# Patient Record
Sex: MALE | Race: WHITE | NOT HISPANIC OR LATINO | Employment: UNEMPLOYED | URBAN - NONMETROPOLITAN AREA
[De-identification: names, ages, dates, MRNs, and addresses within clinical notes are randomized per-mention and may not be internally consistent; named-entity substitution may affect disease eponyms.]

---

## 2020-07-25 ENCOUNTER — APPOINTMENT (OUTPATIENT)
Dept: CT IMAGING | Facility: HOSPITAL | Age: 55
End: 2020-07-25

## 2020-07-25 ENCOUNTER — APPOINTMENT (OUTPATIENT)
Dept: GENERAL RADIOLOGY | Facility: HOSPITAL | Age: 55
End: 2020-07-25

## 2020-07-25 ENCOUNTER — HOSPITAL ENCOUNTER (EMERGENCY)
Facility: HOSPITAL | Age: 55
Discharge: HOME | End: 2020-07-26
Attending: FAMILY MEDICINE | Admitting: FAMILY MEDICINE

## 2020-07-25 VITALS
HEIGHT: 71 IN | OXYGEN SATURATION: 96 % | RESPIRATION RATE: 18 BRPM | TEMPERATURE: 98.5 F | HEART RATE: 93 BPM | DIASTOLIC BLOOD PRESSURE: 80 MMHG | SYSTOLIC BLOOD PRESSURE: 160 MMHG | WEIGHT: 275 LBS | BODY MASS INDEX: 38.5 KG/M2

## 2020-07-25 DIAGNOSIS — R53.1 WEAKNESS: Primary | ICD-10-CM

## 2020-07-25 DIAGNOSIS — R29.6 RECURRENT FALLS WHILE WALKING: ICD-10-CM

## 2020-07-25 LAB
ALBUMIN SERPL-MCNC: 4.31 G/DL (ref 3.5–5.2)
ALBUMIN/GLOB SERPL: 1.4 G/DL
ALP SERPL-CCNC: 144 U/L (ref 39–117)
ALT SERPL W P-5'-P-CCNC: 22 U/L (ref 1–41)
AMMONIA BLD-SCNC: 41 UMOL/L (ref 16–60)
ANION GAP SERPL CALCULATED.3IONS-SCNC: 15.5 MMOL/L (ref 5–15)
AST SERPL-CCNC: 22 U/L (ref 1–40)
BASOPHILS # BLD AUTO: 0.03 10*3/MM3 (ref 0–0.2)
BASOPHILS NFR BLD AUTO: 1 % (ref 0–1.5)
BILIRUB SERPL-MCNC: 0.6 MG/DL (ref 0–1.2)
BUN SERPL-MCNC: 14 MG/DL (ref 6–20)
BUN/CREAT SERPL: 15.2 (ref 7–25)
CALCIUM SPEC-SCNC: 10.5 MG/DL (ref 8.6–10.5)
CHLORIDE SERPL-SCNC: 98 MMOL/L (ref 98–107)
CO2 SERPL-SCNC: 24.5 MMOL/L (ref 22–29)
CREAT SERPL-MCNC: 0.92 MG/DL (ref 0.76–1.27)
DEPRECATED RDW RBC AUTO: 48 FL (ref 37–54)
EOSINOPHIL # BLD AUTO: 0.07 10*3/MM3 (ref 0–0.4)
EOSINOPHIL NFR BLD AUTO: 2.4 % (ref 0.3–6.2)
ERYTHROCYTE [DISTWIDTH] IN BLOOD BY AUTOMATED COUNT: 15.3 % (ref 12.3–15.4)
GFR SERPL CREATININE-BSD FRML MDRD: 85 ML/MIN/1.73
GLOBULIN UR ELPH-MCNC: 3.2 GM/DL
GLUCOSE SERPL-MCNC: 178 MG/DL (ref 65–99)
HCT VFR BLD AUTO: 47.2 % (ref 37.5–51)
HGB BLD-MCNC: 15 G/DL (ref 13–17.7)
HOLD SPECIMEN: NORMAL
HOLD SPECIMEN: NORMAL
IMM GRANULOCYTES # BLD AUTO: 0.01 10*3/MM3 (ref 0–0.05)
IMM GRANULOCYTES NFR BLD AUTO: 0.3 % (ref 0–0.5)
LYMPHOCYTES # BLD AUTO: 0.83 10*3/MM3 (ref 0.7–3.1)
LYMPHOCYTES NFR BLD AUTO: 28.8 % (ref 19.6–45.3)
MAGNESIUM SERPL-MCNC: 1.8 MG/DL (ref 1.6–2.6)
MCH RBC QN AUTO: 28.1 PG (ref 26.6–33)
MCHC RBC AUTO-ENTMCNC: 31.8 G/DL (ref 31.5–35.7)
MCV RBC AUTO: 88.4 FL (ref 79–97)
MONOCYTES # BLD AUTO: 0.26 10*3/MM3 (ref 0.1–0.9)
MONOCYTES NFR BLD AUTO: 9 % (ref 5–12)
NEUTROPHILS NFR BLD AUTO: 1.68 10*3/MM3 (ref 1.7–7)
NEUTROPHILS NFR BLD AUTO: 58.5 % (ref 42.7–76)
NRBC BLD AUTO-RTO: 0 /100 WBC (ref 0–0.2)
NT-PROBNP SERPL-MCNC: 23.6 PG/ML (ref 0–900)
PLATELET # BLD AUTO: 90 10*3/MM3 (ref 140–450)
PMV BLD AUTO: 10 FL (ref 6–12)
POTASSIUM SERPL-SCNC: 4.5 MMOL/L (ref 3.5–5.2)
PROT SERPL-MCNC: 7.5 G/DL (ref 6–8.5)
RBC # BLD AUTO: 5.34 10*6/MM3 (ref 4.14–5.8)
SODIUM SERPL-SCNC: 138 MMOL/L (ref 136–145)
T4 FREE SERPL-MCNC: 0.88 NG/DL (ref 0.93–1.7)
TROPONIN T SERPL-MCNC: <0.01 NG/ML (ref 0–0.03)
TSH SERPL DL<=0.05 MIU/L-ACNC: 1.36 UIU/ML (ref 0.27–4.2)
WBC # BLD AUTO: 2.88 10*3/MM3 (ref 3.4–10.8)
WHOLE BLOOD HOLD SPECIMEN: NORMAL
WHOLE BLOOD HOLD SPECIMEN: NORMAL

## 2020-07-25 PROCEDURE — 72131 CT LUMBAR SPINE W/O DYE: CPT

## 2020-07-25 PROCEDURE — 84484 ASSAY OF TROPONIN QUANT: CPT | Performed by: FAMILY MEDICINE

## 2020-07-25 PROCEDURE — 99283 EMERGENCY DEPT VISIT LOW MDM: CPT

## 2020-07-25 PROCEDURE — 80050 GENERAL HEALTH PANEL: CPT | Performed by: FAMILY MEDICINE

## 2020-07-25 PROCEDURE — 84439 ASSAY OF FREE THYROXINE: CPT | Performed by: FAMILY MEDICINE

## 2020-07-25 PROCEDURE — 93010 ELECTROCARDIOGRAM REPORT: CPT | Performed by: INTERNAL MEDICINE

## 2020-07-25 PROCEDURE — 72170 X-RAY EXAM OF PELVIS: CPT

## 2020-07-25 PROCEDURE — 82140 ASSAY OF AMMONIA: CPT | Performed by: FAMILY MEDICINE

## 2020-07-25 PROCEDURE — 81003 URINALYSIS AUTO W/O SCOPE: CPT | Performed by: FAMILY MEDICINE

## 2020-07-25 PROCEDURE — 93005 ELECTROCARDIOGRAM TRACING: CPT | Performed by: FAMILY MEDICINE

## 2020-07-25 PROCEDURE — 70450 CT HEAD/BRAIN W/O DYE: CPT

## 2020-07-25 PROCEDURE — 71045 X-RAY EXAM CHEST 1 VIEW: CPT

## 2020-07-25 PROCEDURE — 83735 ASSAY OF MAGNESIUM: CPT | Performed by: FAMILY MEDICINE

## 2020-07-25 PROCEDURE — 83880 ASSAY OF NATRIURETIC PEPTIDE: CPT | Performed by: FAMILY MEDICINE

## 2020-07-25 RX ORDER — SODIUM CHLORIDE 0.9 % (FLUSH) 0.9 %
10 SYRINGE (ML) INJECTION AS NEEDED
Status: DISCONTINUED | OUTPATIENT
Start: 2020-07-25 | End: 2020-07-26 | Stop reason: HOSPADM

## 2020-07-26 LAB
BILIRUB UR QL STRIP: NEGATIVE
CLARITY UR: CLEAR
COLOR UR: YELLOW
GLUCOSE UR STRIP-MCNC: ABNORMAL MG/DL
HGB UR QL STRIP.AUTO: NEGATIVE
KETONES UR QL STRIP: NEGATIVE
LEUKOCYTE ESTERASE UR QL STRIP.AUTO: NEGATIVE
NITRITE UR QL STRIP: NEGATIVE
PH UR STRIP.AUTO: <=5 [PH] (ref 5–8)
PROT UR QL STRIP: NEGATIVE
SP GR UR STRIP: 1.03 (ref 1–1.03)
UROBILINOGEN UR QL STRIP: ABNORMAL

## 2020-07-26 NOTE — ED PROVIDER NOTES
Subjective   54-year-old male presents the emergency room complaints of weakness.  Patient reports he is had generalized weakness since January of this year.  He states that he has had progressive weakness.  He states he had recurrent falls as well.  He states that falls are getting more frequent.  He reports that he has had 2 days ago.  He denies loss conscious.  He does report he is had low back pain as well with his falls.  He states that he seen his family doctor but has not been given any type of work-up or referral.  He reports he is had no chest pain or shortness of breath no abdominal pain.  He does report that he has had occasional episodes of urine incontinence but states he is on a bladder pill for this this is been ongoing for quite some time.      Fatigue   Location:  Generalized  Severity:  Moderate  Duration:  8 months  Timing:  Constant  Progression:  Worsening  Associated symptoms: fatigue    Associated symptoms: no abdominal pain, no chest pain, no congestion, no cough, no diarrhea, no fever, no nausea, no rhinorrhea, no shortness of breath and no wheezing        Review of Systems   Constitutional: Positive for fatigue. Negative for fever.   HENT: Negative for congestion and rhinorrhea.    Respiratory: Negative for cough, shortness of breath and wheezing.    Cardiovascular: Negative for chest pain.   Gastrointestinal: Negative for abdominal pain, diarrhea and nausea.   All other systems reviewed and are negative.      No past medical history on file.    Allergies   Allergen Reactions   • Peanut Oil Nausea Only   • Sulfa Antibiotics Hives       No past surgical history on file.    No family history on file.    Social History     Socioeconomic History   • Marital status:      Spouse name: Not on file   • Number of children: Not on file   • Years of education: Not on file   • Highest education level: Not on file           Objective   Physical Exam   Constitutional: He is oriented to person,  place, and time. No distress.   HENT:   Head: Normocephalic and atraumatic.   Mouth/Throat: Oropharynx is clear and moist.   No hemotympanum.  No midface instability.  No nasal hematoma.   Eyes: Pupils are equal, round, and reactive to light. Conjunctivae and EOM are normal.   Neck: Neck supple. No JVD present.   No carotid bruit   Cardiovascular: Normal rate, regular rhythm and normal heart sounds.   2+ radial pulse 2+ dorsalis pedis pulses   Pulmonary/Chest: Effort normal and breath sounds normal. He has no wheezes. He has no rales.   Speaks in full sentences.  No accessory muscle use   Abdominal: Soft. Bowel sounds are normal. There is no tenderness. There is no rebound and no guarding.   Musculoskeletal: He exhibits edema. He exhibits no tenderness.   1+ pitting of bilateral extremities   Lymphadenopathy:     He has no cervical adenopathy.   Neurological: He is alert and oriented to person, place, and time.   No saddle anesthesia symmetric sensation light touch   Skin: Skin is warm and dry. Capillary refill takes less than 2 seconds.   Psychiatric: He has a normal mood and affect.   Nursing note and vitals reviewed.      Procedures           ED Course  ED Course as of Jul 26 0032   Sat Jul 25, 2020   2311 Ct Head Without Contrast    Result Date: 7/25/2020  Normal, negative unenhanced head CT. Signer Name: MICHAEL May MD  Signed: 7/25/2020 10:38 PM  Workstation Name: [id number]  [facility]    Ct Lumbar Spine Without Contrast    Result Date: 7/25/2020  1. No acute fracture or malalignment. Degenerative disease as above. 2. Partially visible is nonspecific fat stranding in the right lateral abdomen of unknown etiology and significance. Consider follow-up with a CT abdomen and pelvis. Signer Name: Ryley Watson MD  Signed: 7/25/2020 10:55 PM  Workstation Name: [email or web address]  [facility]    Xr Chest 1 View    Result Date: 7/25/2020  No acute cardiopulmonary  findings. Signer Name: Ryley Watson MD  Signed: 7/25/2020 10:41 PM  Workstation Name: [facility]  [facility]    Xr Pelvis 1 Or 2 View    Result Date: 7/25/2020  No acute findings. Signer Name: Ryley Watson MD  Signed: 7/25/2020 10:42 PM  Workstation Name: [facility]  [facility]        [BB]   2336 Patient had degenerative changes on lumbar spine.  Patient awaiting urinalysis.  CT scan shows no acute findings.  Patient symptoms of been ongoing for months.  Patient has no focal neurologic deficits.  Patient would likely benefit from physical therapy as an outpatient setting.  Will repeat cardiac enzymes as well as await urinalysis.    [BB]   Sun Jul 26, 2020   0020 Patient urinalysis unremarkable    [BB]   0029 Patient has had ongoing weakness for months.  Have discussed physical therapy with patient.  Have also discussed neurology evaluation.  Patient is agreeable to this he states that he is agreeable to a short-term rehabilitation facility have discussed contacting social work to assist with patient with obtaining this given that he feels that his primary care is not addressing his issues.    [BB]      ED Course User Index  [BB] Chandra Ram MD                                           [facility]    Final diagnoses:   Weakness   Recurrent falls while walking            Chandra Ram MD  07/26/20 0032

## 2020-07-26 NOTE — ED NOTES
Patient placed in wheelchair in the lobby at this time, PADMINI. KIM.      Jeanie Saenz, RN  07/25/20 5033

## 2020-07-30 ENCOUNTER — HOSPITAL ENCOUNTER (EMERGENCY)
Facility: HOSPITAL | Age: 55
Discharge: STILL ADMITTED OR ADMINISTRATIVE | End: 2020-07-30
Attending: FAMILY MEDICINE

## 2020-07-30 ENCOUNTER — HOSPITAL ENCOUNTER (INPATIENT)
Facility: HOSPITAL | Age: 55
LOS: 5 days | Discharge: HOME | End: 2020-08-04
Attending: PSYCHIATRY & NEUROLOGY

## 2020-07-30 VITALS
TEMPERATURE: 98.3 F | BODY MASS INDEX: 37.52 KG/M2 | HEIGHT: 71 IN | RESPIRATION RATE: 18 BRPM | SYSTOLIC BLOOD PRESSURE: 145 MMHG | HEART RATE: 95 BPM | DIASTOLIC BLOOD PRESSURE: 80 MMHG | WEIGHT: 268 LBS | OXYGEN SATURATION: 98 %

## 2020-07-30 DIAGNOSIS — F11.10 OPIOID ABUSE (HCC): ICD-10-CM

## 2020-07-30 DIAGNOSIS — F13.10 BENZODIAZEPINE ABUSE (HCC): Primary | ICD-10-CM

## 2020-07-30 PROBLEM — F32.9 MDD (MAJOR DEPRESSIVE DISORDER): Status: ACTIVE | Noted: 2020-07-30

## 2020-07-30 LAB
6-ACETYL MORPHINE: NEGATIVE
ALBUMIN SERPL-MCNC: 4.41 G/DL (ref 3.5–5.2)
ALBUMIN/GLOB SERPL: 1.5 G/DL
ALP SERPL-CCNC: 134 U/L (ref 39–117)
ALT SERPL W P-5'-P-CCNC: 21 U/L (ref 1–41)
AMPHET+METHAMPHET UR QL: NEGATIVE
ANION GAP SERPL CALCULATED.3IONS-SCNC: 12.8 MMOL/L (ref 5–15)
AST SERPL-CCNC: 24 U/L (ref 1–40)
BARBITURATES UR QL SCN: NEGATIVE
BASOPHILS # BLD AUTO: 0.02 10*3/MM3 (ref 0–0.2)
BASOPHILS NFR BLD AUTO: 0.7 % (ref 0–1.5)
BENZODIAZ UR QL SCN: POSITIVE
BILIRUB SERPL-MCNC: 0.7 MG/DL (ref 0–1.2)
BILIRUB UR QL STRIP: NEGATIVE
BUN SERPL-MCNC: 15 MG/DL (ref 6–20)
BUN/CREAT SERPL: 15.8 (ref 7–25)
BUPRENORPHINE SERPL-MCNC: NEGATIVE NG/ML
CALCIUM SPEC-SCNC: 10.3 MG/DL (ref 8.6–10.5)
CANNABINOIDS SERPL QL: NEGATIVE
CHLORIDE SERPL-SCNC: 99 MMOL/L (ref 98–107)
CLARITY UR: CLEAR
CO2 SERPL-SCNC: 25.2 MMOL/L (ref 22–29)
COCAINE UR QL: NEGATIVE
COLOR UR: YELLOW
CREAT SERPL-MCNC: 0.95 MG/DL (ref 0.76–1.27)
DEPRECATED RDW RBC AUTO: 50.1 FL (ref 37–54)
EOSINOPHIL # BLD AUTO: 0.11 10*3/MM3 (ref 0–0.4)
EOSINOPHIL NFR BLD AUTO: 3.8 % (ref 0.3–6.2)
ERYTHROCYTE [DISTWIDTH] IN BLOOD BY AUTOMATED COUNT: 15.5 % (ref 12.3–15.4)
ETHANOL BLD-MCNC: <10 MG/DL (ref 0–10)
ETHANOL UR QL: <0.01 %
GFR SERPL CREATININE-BSD FRML MDRD: 82 ML/MIN/1.73
GLOBULIN UR ELPH-MCNC: 3 GM/DL
GLUCOSE SERPL-MCNC: 174 MG/DL (ref 65–99)
GLUCOSE UR STRIP-MCNC: ABNORMAL MG/DL
HCT VFR BLD AUTO: 45.1 % (ref 37.5–51)
HGB BLD-MCNC: 13.8 G/DL (ref 13–17.7)
HGB UR QL STRIP.AUTO: NEGATIVE
IMM GRANULOCYTES # BLD AUTO: 0.01 10*3/MM3 (ref 0–0.05)
IMM GRANULOCYTES NFR BLD AUTO: 0.3 % (ref 0–0.5)
KETONES UR QL STRIP: NEGATIVE
LEUKOCYTE ESTERASE UR QL STRIP.AUTO: NEGATIVE
LYMPHOCYTES # BLD AUTO: 0.97 10*3/MM3 (ref 0.7–3.1)
LYMPHOCYTES NFR BLD AUTO: 33.3 % (ref 19.6–45.3)
MAGNESIUM SERPL-MCNC: 1.7 MG/DL (ref 1.6–2.6)
MCH RBC QN AUTO: 27.5 PG (ref 26.6–33)
MCHC RBC AUTO-ENTMCNC: 30.6 G/DL (ref 31.5–35.7)
MCV RBC AUTO: 89.8 FL (ref 79–97)
METHADONE UR QL SCN: NEGATIVE
MONOCYTES # BLD AUTO: 0.23 10*3/MM3 (ref 0.1–0.9)
MONOCYTES NFR BLD AUTO: 7.9 % (ref 5–12)
NEUTROPHILS NFR BLD AUTO: 1.57 10*3/MM3 (ref 1.7–7)
NEUTROPHILS NFR BLD AUTO: 54 % (ref 42.7–76)
NITRITE UR QL STRIP: NEGATIVE
NRBC BLD AUTO-RTO: 0 /100 WBC (ref 0–0.2)
OPIATES UR QL: NEGATIVE
OXYCODONE UR QL SCN: NEGATIVE
PCP UR QL SCN: NEGATIVE
PH UR STRIP.AUTO: <=5 [PH] (ref 5–8)
PLATELET # BLD AUTO: 84 10*3/MM3 (ref 140–450)
PMV BLD AUTO: 10.2 FL (ref 6–12)
POTASSIUM SERPL-SCNC: 4.6 MMOL/L (ref 3.5–5.2)
PROT SERPL-MCNC: 7.4 G/DL (ref 6–8.5)
PROT UR QL STRIP: NEGATIVE
RBC # BLD AUTO: 5.02 10*6/MM3 (ref 4.14–5.8)
SODIUM SERPL-SCNC: 137 MMOL/L (ref 136–145)
SP GR UR STRIP: >=1.03 (ref 1–1.03)
UROBILINOGEN UR QL STRIP: ABNORMAL
WBC # BLD AUTO: 2.91 10*3/MM3 (ref 3.4–10.8)

## 2020-07-30 PROCEDURE — 81003 URINALYSIS AUTO W/O SCOPE: CPT | Performed by: PHYSICIAN ASSISTANT

## 2020-07-30 PROCEDURE — 80307 DRUG TEST PRSMV CHEM ANLYZR: CPT | Performed by: PHYSICIAN ASSISTANT

## 2020-07-30 PROCEDURE — 80053 COMPREHEN METABOLIC PANEL: CPT | Performed by: PHYSICIAN ASSISTANT

## 2020-07-30 PROCEDURE — 83735 ASSAY OF MAGNESIUM: CPT | Performed by: PHYSICIAN ASSISTANT

## 2020-07-30 PROCEDURE — 99285 EMERGENCY DEPT VISIT HI MDM: CPT

## 2020-07-30 PROCEDURE — 93005 ELECTROCARDIOGRAM TRACING: CPT | Performed by: FAMILY MEDICINE

## 2020-07-30 PROCEDURE — 85025 COMPLETE CBC W/AUTO DIFF WBC: CPT | Performed by: PHYSICIAN ASSISTANT

## 2020-07-30 PROCEDURE — 36415 COLL VENOUS BLD VENIPUNCTURE: CPT

## 2020-07-30 RX ORDER — LACTULOSE 10 G/15ML
20 SOLUTION ORAL DAILY
COMMUNITY

## 2020-07-30 RX ORDER — LOPERAMIDE HYDROCHLORIDE 2 MG/1
2 CAPSULE ORAL
Status: DISCONTINUED | OUTPATIENT
Start: 2020-07-30 | End: 2020-08-04 | Stop reason: HOSPADM

## 2020-07-30 RX ORDER — ONDANSETRON 4 MG/1
4 TABLET, FILM COATED ORAL EVERY 6 HOURS PRN
Status: DISCONTINUED | OUTPATIENT
Start: 2020-07-30 | End: 2020-08-04 | Stop reason: HOSPADM

## 2020-07-30 RX ORDER — SPIRONOLACTONE 50 MG/1
50 TABLET, FILM COATED ORAL DAILY
COMMUNITY

## 2020-07-30 RX ORDER — ALPRAZOLAM 0.5 MG/1
1 TABLET ORAL ONCE
Status: COMPLETED | OUTPATIENT
Start: 2020-07-30 | End: 2020-07-30

## 2020-07-30 RX ORDER — BENZONATATE 100 MG/1
100 CAPSULE ORAL 3 TIMES DAILY PRN
Status: DISCONTINUED | OUTPATIENT
Start: 2020-07-30 | End: 2020-08-04 | Stop reason: HOSPADM

## 2020-07-30 RX ORDER — ALPRAZOLAM 1 MG/1
1 TABLET ORAL DAILY PRN
COMMUNITY
End: 2020-08-04 | Stop reason: HOSPADM

## 2020-07-30 RX ORDER — POTASSIUM CHLORIDE 20 MEQ/1
20 TABLET, EXTENDED RELEASE ORAL DAILY
COMMUNITY

## 2020-07-30 RX ORDER — NICOTINE POLACRILEX 4 MG
15 LOZENGE BUCCAL
Status: DISCONTINUED | OUTPATIENT
Start: 2020-07-30 | End: 2020-08-04 | Stop reason: HOSPADM

## 2020-07-30 RX ORDER — OXYBUTYNIN CHLORIDE 5 MG/1
2.5 TABLET ORAL 2 TIMES DAILY
COMMUNITY

## 2020-07-30 RX ORDER — BENZTROPINE MESYLATE 1 MG/1
2 TABLET ORAL ONCE AS NEEDED
Status: DISCONTINUED | OUTPATIENT
Start: 2020-07-30 | End: 2020-08-04 | Stop reason: HOSPADM

## 2020-07-30 RX ORDER — ATORVASTATIN CALCIUM 80 MG/1
80 TABLET, FILM COATED ORAL DAILY
COMMUNITY

## 2020-07-30 RX ORDER — GABAPENTIN 800 MG/1
800 TABLET ORAL 3 TIMES DAILY
COMMUNITY

## 2020-07-30 RX ORDER — ECHINACEA PURPUREA EXTRACT 125 MG
2 TABLET ORAL AS NEEDED
Status: DISCONTINUED | OUTPATIENT
Start: 2020-07-30 | End: 2020-08-04 | Stop reason: HOSPADM

## 2020-07-30 RX ORDER — FUROSEMIDE 20 MG/1
20 TABLET ORAL DAILY
COMMUNITY

## 2020-07-30 RX ORDER — LORATADINE 10 MG/1
10 TABLET ORAL DAILY
COMMUNITY

## 2020-07-30 RX ORDER — FAMOTIDINE 20 MG/1
20 TABLET, FILM COATED ORAL 2 TIMES DAILY PRN
Status: DISCONTINUED | OUTPATIENT
Start: 2020-07-30 | End: 2020-08-04 | Stop reason: HOSPADM

## 2020-07-30 RX ORDER — LISINOPRIL 10 MG/1
10 TABLET ORAL DAILY
COMMUNITY

## 2020-07-30 RX ORDER — GABAPENTIN 400 MG/1
800 CAPSULE ORAL 3 TIMES DAILY
Status: CANCELLED | OUTPATIENT
Start: 2020-07-31

## 2020-07-30 RX ORDER — TRAZODONE HYDROCHLORIDE 150 MG/1
150 TABLET ORAL NIGHTLY
COMMUNITY

## 2020-07-30 RX ORDER — HYDROXYZINE 50 MG/1
50 TABLET, FILM COATED ORAL EVERY 6 HOURS PRN
Status: DISCONTINUED | OUTPATIENT
Start: 2020-07-30 | End: 2020-08-04 | Stop reason: HOSPADM

## 2020-07-30 RX ORDER — ACETAMINOPHEN 325 MG/1
650 TABLET ORAL EVERY 6 HOURS PRN
Status: DISCONTINUED | OUTPATIENT
Start: 2020-07-30 | End: 2020-08-04 | Stop reason: HOSPADM

## 2020-07-30 RX ORDER — AMITRIPTYLINE HYDROCHLORIDE 25 MG/1
25 TABLET, FILM COATED ORAL NIGHTLY
COMMUNITY

## 2020-07-30 RX ORDER — IBUPROFEN 400 MG/1
400 TABLET ORAL EVERY 6 HOURS PRN
Status: DISCONTINUED | OUTPATIENT
Start: 2020-07-30 | End: 2020-08-04 | Stop reason: HOSPADM

## 2020-07-30 RX ORDER — NADOLOL 20 MG/1
20 TABLET ORAL DAILY
COMMUNITY

## 2020-07-30 RX ORDER — PANTOPRAZOLE SODIUM 40 MG/1
40 TABLET, DELAYED RELEASE ORAL DAILY
COMMUNITY

## 2020-07-30 RX ORDER — TRAZODONE HYDROCHLORIDE 50 MG/1
150 TABLET ORAL NIGHTLY
Status: CANCELLED | OUTPATIENT
Start: 2020-07-31

## 2020-07-30 RX ORDER — FLUTICASONE PROPIONATE 50 MCG
1 SPRAY, SUSPENSION (ML) NASAL DAILY
COMMUNITY

## 2020-07-30 RX ORDER — ALBUTEROL SULFATE 2.5 MG/3ML
2.5 SOLUTION RESPIRATORY (INHALATION) EVERY 6 HOURS PRN
COMMUNITY

## 2020-07-30 RX ORDER — BENZTROPINE MESYLATE 1 MG/ML
1 INJECTION INTRAMUSCULAR; INTRAVENOUS ONCE AS NEEDED
Status: DISCONTINUED | OUTPATIENT
Start: 2020-07-30 | End: 2020-08-04 | Stop reason: HOSPADM

## 2020-07-30 RX ORDER — ALUMINA, MAGNESIA, AND SIMETHICONE 2400; 2400; 240 MG/30ML; MG/30ML; MG/30ML
15 SUSPENSION ORAL EVERY 6 HOURS PRN
Status: DISCONTINUED | OUTPATIENT
Start: 2020-07-30 | End: 2020-08-04 | Stop reason: HOSPADM

## 2020-07-30 RX ORDER — ALPRAZOLAM 0.5 MG/1
1 TABLET ORAL DAILY PRN
Status: CANCELLED | OUTPATIENT
Start: 2020-07-30

## 2020-07-30 RX ORDER — DEXTROSE MONOHYDRATE 25 G/50ML
25 INJECTION, SOLUTION INTRAVENOUS
Status: DISCONTINUED | OUTPATIENT
Start: 2020-07-30 | End: 2020-08-04 | Stop reason: HOSPADM

## 2020-07-30 RX ORDER — TRAZODONE HYDROCHLORIDE 50 MG/1
50 TABLET ORAL NIGHTLY PRN
Status: DISCONTINUED | OUTPATIENT
Start: 2020-07-30 | End: 2020-08-01

## 2020-07-30 RX ORDER — HYDROCODONE BITARTRATE AND ACETAMINOPHEN 10; 325 MG/1; MG/1
1 TABLET ORAL EVERY 8 HOURS PRN
COMMUNITY
End: 2020-08-04 | Stop reason: HOSPADM

## 2020-07-30 RX ORDER — HYDROCODONE BITARTRATE AND ACETAMINOPHEN 10; 325 MG/1; MG/1
1 TABLET ORAL EVERY 8 HOURS PRN
Status: CANCELLED | OUTPATIENT
Start: 2020-07-30

## 2020-07-30 RX ORDER — NITROGLYCERIN 0.4 MG/1
0.4 TABLET SUBLINGUAL
COMMUNITY

## 2020-07-30 RX ORDER — ASPIRIN 81 MG/1
81 TABLET ORAL DAILY
COMMUNITY

## 2020-07-30 RX ADMIN — TRAZODONE HYDROCHLORIDE 50 MG: 50 TABLET ORAL at 23:47

## 2020-07-30 RX ADMIN — ALPRAZOLAM 1 MG: 0.5 TABLET ORAL at 23:46

## 2020-07-30 NOTE — DISCHARGE PLACEMENT REQUEST
"Eduardo Ryan (54 y.o. Male)     Date of Birth Social Security Number Address Home Phone MRN    1965  [address] [place] [id number] [phone] [id number]    Yazidism Marital Status          None        Admission Date Admission Type Admitting Provider Attending Provider Department, Room/Bed    7/25/20 Emergency Chandra Ram MD  [facility] Emergency Department, 116/16    Discharge Date Discharge Disposition Discharge Destination        7/26/2020 Home or Self Care              Attending Provider:  (none)   Allergies:  Peanut Oil, Sulfa Antibiotics    Isolation:  None   Infection:  None   Code Status:  Not on file    Ht:  180.3 cm (71\")   Wt:  125 kg (275 lb)    Admission Cmt:  None   Principal Problem:  None                Active Insurance as of 7/25/2020     Primary Coverage     Payor Plan Insurance Group Employer/Plan Group    [facility] [id number]     Payor Plan Address Payor Plan Phone Number Payor Plan Fax Number Effective Dates    [address] [address] [phone]  7/25/2020 - None Entered    [place] [address]       Subscriber Name Subscriber Birth Date Member ID       EDUARDO RYAN 1965 [id number]                 Emergency Contacts      (Rel.) Home Phone Work Phone Mobile Phone    ECHO RYAN (Spouse) [phone] -- --            Emergency Contact Information     Name Relation Home Work Mobile    ECHO RYAN Spouse [phone]            Insurance Information                [facility] MEDICAID Phone: [phone]    Subscriber: Eduardo Ryan Subscriber#: [id number]    Group#: [id number] Precert#:           Treatment Team     Provider Relationship Specialty Contact    Italo Moreno, RN Registered Nurse --               Lab Results (most recent)     Procedure Component Value Units Date/Time    Urinalysis With Microscopic If Indicated (No Culture) - Urine, Clean Catch [[id number]]  (Abnormal) Collected:  " 07/25/20 2355    Specimen:  Urine, Clean Catch Updated:  07/26/20 0012     Color, UA Yellow     Appearance, UA Clear     pH, UA <=5.0     Specific Gravity, UA 1.028     Glucose, UA >=1000 mg/dL (3+)     Ketones, UA Negative     Bilirubin, UA Negative     Blood, UA Negative     Protein, UA Negative     Leuk Esterase, UA Negative     Nitrite, UA Negative     Urobilinogen, UA 1.0 E.U./dL    Narrative:       Urine microscopic not indicated.    [place] Draw [[id number]] Collected:  07/25/20 2217    Specimen:  Blood Updated:  07/25/20 2330    Narrative:       The following orders were created for panel order [place] [facility].  Procedure                               Abnormality         Status                     ---------                               -----------         ------                     Light Blue Top[[id number]]                                   Final result               Green Top (Gel)[[id number]]                                  Final result               Lavender Top[[id number]]                                     Final result               Gold Top - SST[[id number]]                                   Final result                 Please view results for these tests on the individual orders.    Light Blue Top [[id number]] Collected:  07/25/20 2217    Specimen:  Blood Updated:  07/25/20 2330     Extra Tube hold for add-on     Comment: Auto resulted       Green Top (Gel) [[id number]] Collected:  07/25/20 2217    Specimen:  Blood Updated:  07/25/20 2330     Extra Tube Hold for add-ons.     Comment: Auto resulted.       Lavender Top [[id number]] Collected:  07/25/20 2217    Specimen:  Blood Updated:  07/25/20 2330     Extra Tube hold for add-on     Comment: Auto resulted       Gold Top - SST [[id number]] Collected:  07/25/20 2217    Specimen:  Blood Updated:  07/25/20 2330     Extra Tube Hold for add-ons.     Comment: Auto resulted.       BNP [[id number]]  (Normal) Collected:  07/25/20 2217    Specimen:  Blood Updated:   07/25/20 2256     proBNP 23.6 pg/mL     Narrative:       Among patients with dyspnea, NT-proBNP is highly sensitive for the detection of acute congestive heart failure. In addition NT-proBNP of <300 pg/ml effectively rules out acute congestive heart failure with 99% negative predictive value.    Results may be falsely decreased if patient taking Biotin.      Troponin [[id number]]  (Normal) Collected:  07/25/20 2217    Specimen:  Blood Updated:  07/25/20 2256     Troponin T <0.010 ng/mL     Narrative:       Troponin T Reference Range:  <= 0.03 ng/mL-   Negative for AMI  >0.03 ng/mL-     Abnormal for myocardial necrosis.  Clinicians would have to utilize clinical acumen, EKG, Troponin and serial changes to determine if it is an Acute Myocardial Infarction or myocardial injury due to an underlying chronic condition.       Results may be falsely decreased if patient taking Biotin.      TSH [[id number]]  (Normal) Collected:  07/25/20 2217    Specimen:  Blood Updated:  07/25/20 2256     TSH 1.360 uIU/mL     T4, Free [[id number]]  (Abnormal) Collected:  07/25/20 2217    Specimen:  Blood Updated:  07/25/20 2256     Free T4 0.88 ng/dL     Narrative:       Results may be falsely increased if patient taking Biotin.      Comprehensive Metabolic Panel [[id number]]  (Abnormal) Collected:  07/25/20 2217    Specimen:  Blood Updated:  07/25/20 2249     Glucose 178 mg/dL      BUN 14 mg/dL      Creatinine 0.92 mg/dL      Sodium 138 mmol/L      Potassium 4.5 mmol/L      Comment: Specimen hemolyzed.  Results may be affected.        Chloride 98 mmol/L      CO2 24.5 mmol/L      Calcium 10.5 mg/dL      Total Protein 7.5 g/dL      Albumin 4.31 g/dL      ALT (SGPT) 22 U/L      AST (SGOT) 22 U/L      Alkaline Phosphatase 144 U/L      Total Bilirubin 0.6 mg/dL      eGFR Non African Amer 85 mL/min/1.73      Globulin 3.2 gm/dL      A/G Ratio 1.4 g/dL      BUN/Creatinine Ratio 15.2     Anion Gap 15.5 mmol/L     Narrative:       GFR Normal  >60  Chronic Kidney Disease <60  Kidney Failure <15      Magnesium [[id number]]  (Normal) Collected:  07/25/20 2217    Specimen:  Blood Updated:  07/25/20 2249     Magnesium 1.8 mg/dL     CBC & Differential [[id number]] Collected:  07/25/20 2217    Specimen:  Blood Updated:  07/25/20 2246    Narrative:       The following orders were created for panel order CBC & Differential.  Procedure                               Abnormality         Status                     ---------                               -----------         ------                     CBC Auto Differential[[id number]]        Abnormal            Final result                 Please view results for these tests on the individual orders.    CBC Auto Differential [[id number]]  (Abnormal) Collected:  07/25/20 2217    Specimen:  Blood Updated:  07/25/20 2246     WBC 2.88 10*3/mm3      RBC 5.34 10*6/mm3      Hemoglobin 15.0 g/dL      Hematocrit 47.2 %      MCV 88.4 fL      MCH 28.1 pg      MCHC 31.8 g/dL      RDW 15.3 %      RDW-SD 48.0 fl      MPV 10.0 fL      Platelets 90 10*3/mm3      Neutrophil % 58.5 %      Lymphocyte % 28.8 %      Monocyte % 9.0 %      Eosinophil % 2.4 %      Basophil % 1.0 %      Immature Grans % 0.3 %      Neutrophils, Absolute 1.68 10*3/mm3      Lymphocytes, Absolute 0.83 10*3/mm3      Monocytes, Absolute 0.26 10*3/mm3      Eosinophils, Absolute 0.07 10*3/mm3      Basophils, Absolute 0.03 10*3/mm3      Immature Grans, Absolute 0.01 10*3/mm3      nRBC 0.0 /100 WBC     Ammonia [[id number]]  (Normal) Collected:  07/25/20 2217    Specimen:  Blood Updated:  07/25/20 2244     Ammonia 41 umol/L

## 2020-07-30 NOTE — DISCHARGE PLACEMENT REQUEST
"Jacek Ryan (54 y.o. Male)     Date of Birth Social Security Number Address Home Phone MRN    1965  [address] [id number] [phone] [id number]    Pentecostalism Marital Status          None        Admission Date Admission Type Admitting Provider Attending Provider Department, Room/Bed    7/25/20 Emergency Chandra Ram MD  [facility] Emergency Department, 116/16    Discharge Date Discharge Disposition Discharge Destination        7/26/2020 Home or Self Care              Attending Provider:  (none)   Allergies:  Peanut Oil, Sulfa Antibiotics    Isolation:  None   Infection:  None   Code Status:  Not on file    Ht:  180.3 cm (71\")   Wt:  125 kg (275 lb)    Admission Cmt:  None   Principal Problem:  None                Active Insurance as of 7/25/2020     Primary Coverage     Payor Plan Insurance Group Employer/Plan Group    [employer] [place] [place]     Payor Plan Address Payor Plan Phone Number Payor Plan Fax Number Effective Dates    [address] [address] [phone]  7/25/2020 - None Entered    [facility] [id number]       Subscriber Name Subscriber Birth Date Member ID       JACEK RYAN 1965 [id number]                 Emergency Contacts      (Rel.) Home Phone Work Phone Mobile Phone    ECHO RYAN (Spouse) [phone] -- --            {Documentation Categories:[id number]}  "

## 2020-07-31 ENCOUNTER — HOSPITAL ENCOUNTER (INPATIENT)
Facility: HOSPITAL | Age: 55
End: 2020-07-31
Attending: PSYCHIATRY & NEUROLOGY | Admitting: PSYCHIATRY & NEUROLOGY

## 2020-07-31 LAB
BASOPHILS # BLD AUTO: 0.05 10*3/MM3 (ref 0–0.2)
BASOPHILS NFR BLD AUTO: 1.5 % (ref 0–1.5)
CRP SERPL-MCNC: 0.84 MG/DL (ref 0–0.5)
D DIMER PPP FEU-MCNC: 0.68 MCGFEU/ML (ref 0–0.5)
D-LACTATE SERPL-SCNC: 3.3 MMOL/L (ref 0.5–2)
DEPRECATED RDW RBC AUTO: 49.5 FL (ref 37–54)
EOSINOPHIL # BLD AUTO: 0.09 10*3/MM3 (ref 0–0.4)
EOSINOPHIL NFR BLD AUTO: 2.7 % (ref 0.3–6.2)
ERYTHROCYTE [DISTWIDTH] IN BLOOD BY AUTOMATED COUNT: 15.4 % (ref 12.3–15.4)
GLUCOSE BLDC GLUCOMTR-MCNC: 118 MG/DL (ref 70–130)
GLUCOSE BLDC GLUCOMTR-MCNC: 133 MG/DL (ref 70–130)
GLUCOSE BLDC GLUCOMTR-MCNC: 145 MG/DL (ref 70–130)
GLUCOSE BLDC GLUCOMTR-MCNC: 274 MG/DL (ref 70–130)
HCT VFR BLD AUTO: 48.8 % (ref 37.5–51)
HGB BLD-MCNC: 15.1 G/DL (ref 13–17.7)
IMM GRANULOCYTES # BLD AUTO: 0.01 10*3/MM3 (ref 0–0.05)
IMM GRANULOCYTES NFR BLD AUTO: 0.3 % (ref 0–0.5)
LACTATE HOLD SPECIMEN: NORMAL
LDH SERPL-CCNC: 126 U/L (ref 135–225)
LYMPHOCYTES # BLD AUTO: 0.8 10*3/MM3 (ref 0.7–3.1)
LYMPHOCYTES NFR BLD AUTO: 24.2 % (ref 19.6–45.3)
MCH RBC QN AUTO: 27.7 PG (ref 26.6–33)
MCHC RBC AUTO-ENTMCNC: 30.9 G/DL (ref 31.5–35.7)
MCV RBC AUTO: 89.4 FL (ref 79–97)
MONOCYTES # BLD AUTO: 0.35 10*3/MM3 (ref 0.1–0.9)
MONOCYTES NFR BLD AUTO: 10.6 % (ref 5–12)
NEUTROPHILS NFR BLD AUTO: 2 10*3/MM3 (ref 1.7–7)
NEUTROPHILS NFR BLD AUTO: 60.7 % (ref 42.7–76)
NRBC BLD AUTO-RTO: 0 /100 WBC (ref 0–0.2)
PLATELET # BLD AUTO: 77 10*3/MM3 (ref 140–450)
PMV BLD AUTO: 10 FL (ref 6–12)
RBC # BLD AUTO: 5.46 10*6/MM3 (ref 4.14–5.8)
SARS-COV-2 RDRP RESP QL NAA+PROBE: NOT DETECTED
WBC # BLD AUTO: 3.3 10*3/MM3 (ref 3.4–10.8)

## 2020-07-31 PROCEDURE — 83605 ASSAY OF LACTIC ACID: CPT | Performed by: INTERNAL MEDICINE

## 2020-07-31 PROCEDURE — 99253 IP/OBS CNSLTJ NEW/EST LOW 45: CPT | Performed by: INTERNAL MEDICINE

## 2020-07-31 PROCEDURE — 87635 SARS-COV-2 COVID-19 AMP PRB: CPT | Performed by: INTERNAL MEDICINE

## 2020-07-31 PROCEDURE — 82962 GLUCOSE BLOOD TEST: CPT

## 2020-07-31 PROCEDURE — 85025 COMPLETE CBC W/AUTO DIFF WBC: CPT | Performed by: PSYCHIATRY & NEUROLOGY

## 2020-07-31 PROCEDURE — 85379 FIBRIN DEGRADATION QUANT: CPT | Performed by: INTERNAL MEDICINE

## 2020-07-31 PROCEDURE — 94799 UNLISTED PULMONARY SVC/PX: CPT

## 2020-07-31 PROCEDURE — 84145 PROCALCITONIN (PCT): CPT | Performed by: INTERNAL MEDICINE

## 2020-07-31 PROCEDURE — 63710000001 INSULIN ASPART PER 5 UNITS: Performed by: PSYCHIATRY & NEUROLOGY

## 2020-07-31 PROCEDURE — 86140 C-REACTIVE PROTEIN: CPT | Performed by: INTERNAL MEDICINE

## 2020-07-31 PROCEDURE — 83615 LACTATE (LD) (LDH) ENZYME: CPT | Performed by: INTERNAL MEDICINE

## 2020-07-31 RX ORDER — OXYBUTYNIN CHLORIDE 5 MG/1
2.5 TABLET ORAL 2 TIMES DAILY
Status: DISCONTINUED | OUTPATIENT
Start: 2020-07-31 | End: 2020-08-04 | Stop reason: HOSPADM

## 2020-07-31 RX ORDER — CYCLOBENZAPRINE HCL 10 MG
10 TABLET ORAL 3 TIMES DAILY PRN
Status: DISCONTINUED | OUTPATIENT
Start: 2020-07-31 | End: 2020-08-04 | Stop reason: HOSPADM

## 2020-07-31 RX ORDER — LORAZEPAM 2 MG/1
2 TABLET ORAL EVERY 4 HOURS PRN
Status: DISPENSED | OUTPATIENT
Start: 2020-08-01 | End: 2020-08-02

## 2020-07-31 RX ORDER — LACTULOSE 10 G/15ML
20 SOLUTION ORAL DAILY
Status: DISCONTINUED | OUTPATIENT
Start: 2020-07-31 | End: 2020-08-04 | Stop reason: HOSPADM

## 2020-07-31 RX ORDER — LORAZEPAM 0.5 MG/1
0.5 TABLET ORAL EVERY 4 HOURS PRN
Status: DISCONTINUED | OUTPATIENT
Start: 2020-08-04 | End: 2020-08-02

## 2020-07-31 RX ORDER — CLONIDINE HYDROCHLORIDE 0.1 MG/1
0.1 TABLET ORAL 2 TIMES DAILY PRN
Status: ACTIVE | OUTPATIENT
Start: 2020-08-03 | End: 2020-08-04

## 2020-07-31 RX ORDER — ASPIRIN 81 MG/1
81 TABLET ORAL DAILY
Status: DISCONTINUED | OUTPATIENT
Start: 2020-07-31 | End: 2020-08-04 | Stop reason: HOSPADM

## 2020-07-31 RX ORDER — FUROSEMIDE 20 MG/1
20 TABLET ORAL DAILY
Status: DISCONTINUED | OUTPATIENT
Start: 2020-07-31 | End: 2020-08-04 | Stop reason: HOSPADM

## 2020-07-31 RX ORDER — LISINOPRIL 10 MG/1
10 TABLET ORAL DAILY
Status: DISCONTINUED | OUTPATIENT
Start: 2020-07-31 | End: 2020-08-04 | Stop reason: HOSPADM

## 2020-07-31 RX ORDER — CLONIDINE HYDROCHLORIDE 0.1 MG/1
0.1 TABLET ORAL 4 TIMES DAILY PRN
Status: ACTIVE | OUTPATIENT
Start: 2020-08-01 | End: 2020-08-02

## 2020-07-31 RX ORDER — POTASSIUM CHLORIDE 20 MEQ/1
20 TABLET, EXTENDED RELEASE ORAL DAILY
Status: DISCONTINUED | OUTPATIENT
Start: 2020-07-31 | End: 2020-08-04 | Stop reason: HOSPADM

## 2020-07-31 RX ORDER — ALBUTEROL SULFATE 2.5 MG/3ML
2.5 SOLUTION RESPIRATORY (INHALATION) EVERY 6 HOURS PRN
Status: DISCONTINUED | OUTPATIENT
Start: 2020-07-31 | End: 2020-08-04 | Stop reason: HOSPADM

## 2020-07-31 RX ORDER — CLONIDINE HYDROCHLORIDE 0.1 MG/1
0.1 TABLET ORAL DAILY PRN
Status: DISCONTINUED | OUTPATIENT
Start: 2020-08-04 | End: 2020-08-04 | Stop reason: HOSPADM

## 2020-07-31 RX ORDER — LORAZEPAM 1 MG/1
1 TABLET ORAL EVERY 4 HOURS PRN
Status: DISCONTINUED | OUTPATIENT
Start: 2020-08-03 | End: 2020-08-02

## 2020-07-31 RX ORDER — PANTOPRAZOLE SODIUM 40 MG/1
40 TABLET, DELAYED RELEASE ORAL DAILY
Status: DISCONTINUED | OUTPATIENT
Start: 2020-07-31 | End: 2020-08-04 | Stop reason: HOSPADM

## 2020-07-31 RX ORDER — SPIRONOLACTONE 25 MG/1
50 TABLET ORAL DAILY
Status: DISCONTINUED | OUTPATIENT
Start: 2020-07-31 | End: 2020-08-04 | Stop reason: HOSPADM

## 2020-07-31 RX ORDER — CLONIDINE HYDROCHLORIDE 0.1 MG/1
0.1 TABLET ORAL 4 TIMES DAILY PRN
Status: ACTIVE | OUTPATIENT
Start: 2020-07-31 | End: 2020-08-01

## 2020-07-31 RX ORDER — DICYCLOMINE HYDROCHLORIDE 10 MG/1
10 CAPSULE ORAL 3 TIMES DAILY PRN
Status: DISCONTINUED | OUTPATIENT
Start: 2020-07-31 | End: 2020-08-04 | Stop reason: HOSPADM

## 2020-07-31 RX ORDER — AMITRIPTYLINE HYDROCHLORIDE 25 MG/1
25 TABLET, FILM COATED ORAL NIGHTLY
Status: DISCONTINUED | OUTPATIENT
Start: 2020-08-01 | End: 2020-08-04 | Stop reason: HOSPADM

## 2020-07-31 RX ORDER — ATORVASTATIN CALCIUM 40 MG/1
80 TABLET, FILM COATED ORAL DAILY
Status: DISCONTINUED | OUTPATIENT
Start: 2020-07-31 | End: 2020-08-04 | Stop reason: HOSPADM

## 2020-07-31 RX ORDER — FLUTICASONE PROPIONATE 50 MCG
1 SPRAY, SUSPENSION (ML) NASAL DAILY
Status: DISCONTINUED | OUTPATIENT
Start: 2020-07-31 | End: 2020-08-04 | Stop reason: HOSPADM

## 2020-07-31 RX ORDER — CLONIDINE HYDROCHLORIDE 0.1 MG/1
0.1 TABLET ORAL 3 TIMES DAILY PRN
Status: ACTIVE | OUTPATIENT
Start: 2020-08-02 | End: 2020-08-03

## 2020-07-31 RX ORDER — CETIRIZINE HYDROCHLORIDE 10 MG/1
10 TABLET ORAL DAILY
Status: DISCONTINUED | OUTPATIENT
Start: 2020-07-31 | End: 2020-08-04 | Stop reason: HOSPADM

## 2020-07-31 RX ORDER — NITROGLYCERIN 0.4 MG/1
0.4 TABLET SUBLINGUAL
Status: DISCONTINUED | OUTPATIENT
Start: 2020-07-31 | End: 2020-08-04 | Stop reason: HOSPADM

## 2020-07-31 RX ORDER — LORAZEPAM 2 MG/1
2 TABLET ORAL
Status: DISPENSED | OUTPATIENT
Start: 2020-07-31 | End: 2020-08-01

## 2020-07-31 RX ORDER — NADOLOL 40 MG/1
20 TABLET ORAL DAILY
Status: DISCONTINUED | OUTPATIENT
Start: 2020-07-31 | End: 2020-08-04 | Stop reason: HOSPADM

## 2020-07-31 RX ADMIN — FUROSEMIDE 20 MG: 20 TABLET ORAL at 09:29

## 2020-07-31 RX ADMIN — POTASSIUM CHLORIDE 20 MEQ: 1500 TABLET, EXTENDED RELEASE ORAL at 09:30

## 2020-07-31 RX ADMIN — LISINOPRIL 10 MG: 10 TABLET ORAL at 09:30

## 2020-07-31 RX ADMIN — METFORMIN HYDROCHLORIDE 1000 MG: 500 TABLET ORAL at 07:39

## 2020-07-31 RX ADMIN — OXYBUTYNIN CHLORIDE 2.5 MG: 5 TABLET ORAL at 21:58

## 2020-07-31 RX ADMIN — PANTOPRAZOLE SODIUM 40 MG: 40 TABLET, DELAYED RELEASE ORAL at 09:31

## 2020-07-31 RX ADMIN — ATORVASTATIN CALCIUM 80 MG: 40 TABLET, FILM COATED ORAL at 09:29

## 2020-07-31 RX ADMIN — CETIRIZINE HYDROCHLORIDE 10 MG: 10 TABLET, FILM COATED ORAL at 09:29

## 2020-07-31 RX ADMIN — INSULIN ASPART 4 UNITS: 100 INJECTION, SOLUTION INTRAVENOUS; SUBCUTANEOUS at 11:11

## 2020-07-31 RX ADMIN — IBUPROFEN 400 MG: 400 TABLET, FILM COATED ORAL at 22:01

## 2020-07-31 RX ADMIN — OXYBUTYNIN CHLORIDE 2.5 MG: 5 TABLET ORAL at 09:29

## 2020-07-31 RX ADMIN — METFORMIN HYDROCHLORIDE 1000 MG: 500 TABLET ORAL at 17:39

## 2020-07-31 RX ADMIN — CYCLOBENZAPRINE HYDROCHLORIDE 10 MG: 10 TABLET, FILM COATED ORAL at 22:01

## 2020-07-31 RX ADMIN — ASPIRIN 81 MG: 81 TABLET, COATED ORAL at 09:29

## 2020-07-31 RX ADMIN — SPIRONOLACTONE 50 MG: 25 TABLET ORAL at 09:29

## 2020-07-31 RX ADMIN — LACTULOSE 20 G: 10 SOLUTION ORAL at 09:31

## 2020-07-31 RX ADMIN — FLUTICASONE PROPIONATE 1 SPRAY: 50 SPRAY, METERED NASAL at 09:31

## 2020-08-01 ENCOUNTER — APPOINTMENT (OUTPATIENT)
Dept: CT IMAGING | Facility: HOSPITAL | Age: 55
End: 2020-08-01

## 2020-08-01 ENCOUNTER — APPOINTMENT (OUTPATIENT)
Dept: ULTRASOUND IMAGING | Facility: HOSPITAL | Age: 55
End: 2020-08-01

## 2020-08-01 LAB
BASOPHILS # BLD AUTO: 0.03 10*3/MM3 (ref 0–0.2)
BASOPHILS NFR BLD AUTO: 1.1 % (ref 0–1.5)
D-LACTATE SERPL-SCNC: 2.5 MMOL/L (ref 0.5–2)
DEPRECATED RDW RBC AUTO: 48.7 FL (ref 37–54)
EOSINOPHIL # BLD AUTO: 0.1 10*3/MM3 (ref 0–0.4)
EOSINOPHIL NFR BLD AUTO: 3.8 % (ref 0.3–6.2)
ERYTHROCYTE [DISTWIDTH] IN BLOOD BY AUTOMATED COUNT: 15.3 % (ref 12.3–15.4)
GLUCOSE BLDC GLUCOMTR-MCNC: 115 MG/DL (ref 70–130)
GLUCOSE BLDC GLUCOMTR-MCNC: 131 MG/DL (ref 70–130)
GLUCOSE BLDC GLUCOMTR-MCNC: 138 MG/DL (ref 70–130)
GLUCOSE BLDC GLUCOMTR-MCNC: 157 MG/DL (ref 70–130)
HCT VFR BLD AUTO: 44.8 % (ref 37.5–51)
HGB BLD-MCNC: 14 G/DL (ref 13–17.7)
IMM GRANULOCYTES # BLD AUTO: 0.02 10*3/MM3 (ref 0–0.05)
IMM GRANULOCYTES NFR BLD AUTO: 0.8 % (ref 0–0.5)
LYMPHOCYTES # BLD AUTO: 0.71 10*3/MM3 (ref 0.7–3.1)
LYMPHOCYTES NFR BLD AUTO: 27.2 % (ref 19.6–45.3)
MCH RBC QN AUTO: 27.7 PG (ref 26.6–33)
MCHC RBC AUTO-ENTMCNC: 31.3 G/DL (ref 31.5–35.7)
MCV RBC AUTO: 88.7 FL (ref 79–97)
MONOCYTES # BLD AUTO: 0.24 10*3/MM3 (ref 0.1–0.9)
MONOCYTES NFR BLD AUTO: 9.2 % (ref 5–12)
NEUTROPHILS NFR BLD AUTO: 1.51 10*3/MM3 (ref 1.7–7)
NEUTROPHILS NFR BLD AUTO: 57.9 % (ref 42.7–76)
NRBC BLD AUTO-RTO: 0 /100 WBC (ref 0–0.2)
PLATELET # BLD AUTO: 64 10*3/MM3 (ref 140–450)
PMV BLD AUTO: 10.3 FL (ref 6–12)
PROCALCITONIN SERPL-MCNC: 0.09 NG/ML (ref 0–0.25)
RBC # BLD AUTO: 5.05 10*6/MM3 (ref 4.14–5.8)
WBC # BLD AUTO: 2.61 10*3/MM3 (ref 3.4–10.8)

## 2020-08-01 PROCEDURE — 99233 SBSQ HOSP IP/OBS HIGH 50: CPT | Performed by: PSYCHIATRY & NEUROLOGY

## 2020-08-01 PROCEDURE — 94799 UNLISTED PULMONARY SVC/PX: CPT

## 2020-08-01 PROCEDURE — 93970 EXTREMITY STUDY: CPT

## 2020-08-01 PROCEDURE — 82962 GLUCOSE BLOOD TEST: CPT

## 2020-08-01 PROCEDURE — 71275 CT ANGIOGRAPHY CHEST: CPT

## 2020-08-01 PROCEDURE — 85025 COMPLETE CBC W/AUTO DIFF WBC: CPT | Performed by: PSYCHIATRY & NEUROLOGY

## 2020-08-01 PROCEDURE — 0 IOVERSOL 68 % SOLUTION: Performed by: PSYCHIATRY & NEUROLOGY

## 2020-08-01 RX ORDER — CLONAZEPAM 0.5 MG/1
0.5 TABLET ORAL EVERY 12 HOURS SCHEDULED
Status: COMPLETED | OUTPATIENT
Start: 2020-08-01 | End: 2020-08-03

## 2020-08-01 RX ORDER — TRAZODONE HYDROCHLORIDE 50 MG/1
150 TABLET ORAL NIGHTLY
Status: DISCONTINUED | OUTPATIENT
Start: 2020-08-01 | End: 2020-08-04 | Stop reason: HOSPADM

## 2020-08-01 RX ADMIN — TRAZODONE HYDROCHLORIDE 50 MG: 50 TABLET ORAL at 00:09

## 2020-08-01 RX ADMIN — FLUTICASONE PROPIONATE 1 SPRAY: 50 SPRAY, METERED NASAL at 09:18

## 2020-08-01 RX ADMIN — CLONAZEPAM 0.5 MG: 0.5 TABLET ORAL at 20:42

## 2020-08-01 RX ADMIN — NADOLOL 20 MG: 40 TABLET ORAL at 08:03

## 2020-08-01 RX ADMIN — ASPIRIN 81 MG: 81 TABLET, COATED ORAL at 09:17

## 2020-08-01 RX ADMIN — LORAZEPAM 2 MG: 2 TABLET ORAL at 15:40

## 2020-08-01 RX ADMIN — IOVERSOL 80 ML: 678 INJECTION INTRA-ARTERIAL; INTRAVENOUS at 09:45

## 2020-08-01 RX ADMIN — HYDROXYZINE HYDROCHLORIDE 50 MG: 50 TABLET ORAL at 00:09

## 2020-08-01 RX ADMIN — PANTOPRAZOLE SODIUM 40 MG: 40 TABLET, DELAYED RELEASE ORAL at 09:18

## 2020-08-01 RX ADMIN — CETIRIZINE HYDROCHLORIDE 10 MG: 10 TABLET, FILM COATED ORAL at 09:17

## 2020-08-01 RX ADMIN — OXYBUTYNIN CHLORIDE 2.5 MG: 5 TABLET ORAL at 09:17

## 2020-08-01 RX ADMIN — RIFAXIMIN 550 MG: 550 TABLET ORAL at 09:16

## 2020-08-01 RX ADMIN — RIFAXIMIN 550 MG: 550 TABLET ORAL at 20:41

## 2020-08-01 RX ADMIN — POTASSIUM CHLORIDE 20 MEQ: 1500 TABLET, EXTENDED RELEASE ORAL at 09:18

## 2020-08-01 RX ADMIN — OXYBUTYNIN CHLORIDE 2.5 MG: 5 TABLET ORAL at 20:41

## 2020-08-01 RX ADMIN — FUROSEMIDE 20 MG: 20 TABLET ORAL at 08:04

## 2020-08-01 RX ADMIN — SPIRONOLACTONE 50 MG: 25 TABLET ORAL at 08:04

## 2020-08-01 RX ADMIN — AMITRIPTYLINE HYDROCHLORIDE 25 MG: 25 TABLET, FILM COATED ORAL at 20:41

## 2020-08-01 RX ADMIN — LORAZEPAM 2 MG: 2 TABLET ORAL at 02:23

## 2020-08-01 RX ADMIN — ACETAMINOPHEN 650 MG: 325 TABLET ORAL at 08:05

## 2020-08-01 RX ADMIN — TRAZODONE HYDROCHLORIDE 150 MG: 50 TABLET ORAL at 20:41

## 2020-08-01 RX ADMIN — ATORVASTATIN CALCIUM 80 MG: 40 TABLET, FILM COATED ORAL at 09:16

## 2020-08-01 RX ADMIN — LISINOPRIL 10 MG: 10 TABLET ORAL at 08:04

## 2020-08-01 RX ADMIN — LACTULOSE 20 G: 10 SOLUTION ORAL at 09:17

## 2020-08-01 NOTE — NURSING NOTE
New order received for pt to have routine CT angiogram in am. Radiology requests clarification if PE protocol is to be used. Results of repeat lactate level of 2.5 received from lab. On call hospitalist Dr Jim poole x 2.Will inform day shift staff to follow up on order clarification for radiology.

## 2020-08-01 NOTE — NURSING NOTE
At 2201 patient was given motrin and flexeril for complaint of back pain/spasms tolerated well.     At 0009 patient was given Vistaril 50 mg for anxiety and Trazodone for sleep. Patient continues to be awake.

## 2020-08-01 NOTE — PLAN OF CARE
Problem: Patient Care Overview  Goal: Plan of Care Review  Outcome: Ongoing (interventions implemented as appropriate)  Flowsheets (Taken 8/1/2020 0419)  Plan of Care Reviewed With: patient  Patient Agreement with Plan of Care: agrees  Note:   Pt alert and oriented. Calm and cooperative. Denies SI, HI or AVH. Denies anxiety and depression. Sleep disruption noted this pm.

## 2020-08-01 NOTE — PROGRESS NOTES
I called and discussed with the nursing staff.  Patient had elevated lactic acid which is likely due to metformin use and I have discontinued his metformin.  Lactic acid is improving.  Will repeat lactic acid in a.m.  Advised nursing staff to encourage increased fluid intake.  Patient also had mildly elevated d-dimer.  He was not hypoxemic.  CTA chest showed no evidence of PE.  Will check lower extremity Doppler ultrasound to rule out DVT.  WBC and platelet count remain low due to underlying chronic liver disease and was advised to monitor platelet count daily.

## 2020-08-01 NOTE — NURSING NOTE
1930 Lab called with critical D-Dimer of 0.68 and at 1940 lab called with a critical Lactate of 3.3. Dr Fernandez notified, he said he would wait for all labs before he made any changes.

## 2020-08-02 LAB
GLUCOSE BLDC GLUCOMTR-MCNC: 134 MG/DL (ref 70–130)
GLUCOSE BLDC GLUCOMTR-MCNC: 153 MG/DL (ref 70–130)
GLUCOSE BLDC GLUCOMTR-MCNC: 161 MG/DL (ref 70–130)
GLUCOSE BLDC GLUCOMTR-MCNC: 255 MG/DL (ref 70–130)

## 2020-08-02 PROCEDURE — 82962 GLUCOSE BLOOD TEST: CPT

## 2020-08-02 PROCEDURE — 99232 SBSQ HOSP IP/OBS MODERATE 35: CPT | Performed by: PSYCHIATRY & NEUROLOGY

## 2020-08-02 PROCEDURE — 63710000001 INSULIN ASPART PER 5 UNITS: Performed by: PSYCHIATRY & NEUROLOGY

## 2020-08-02 PROCEDURE — 94799 UNLISTED PULMONARY SVC/PX: CPT

## 2020-08-02 RX ORDER — LORAZEPAM 0.5 MG/1
0.5 TABLET ORAL EVERY 4 HOURS PRN
Status: ACTIVE | OUTPATIENT
Start: 2020-08-02 | End: 2020-08-03

## 2020-08-02 RX ADMIN — POTASSIUM CHLORIDE 20 MEQ: 1500 TABLET, EXTENDED RELEASE ORAL at 08:28

## 2020-08-02 RX ADMIN — NADOLOL 20 MG: 40 TABLET ORAL at 08:25

## 2020-08-02 RX ADMIN — RIFAXIMIN 550 MG: 550 TABLET ORAL at 08:26

## 2020-08-02 RX ADMIN — LISINOPRIL 10 MG: 10 TABLET ORAL at 09:54

## 2020-08-02 RX ADMIN — INSULIN ASPART 2 UNITS: 100 INJECTION, SOLUTION INTRAVENOUS; SUBCUTANEOUS at 07:56

## 2020-08-02 RX ADMIN — SPIRONOLACTONE 50 MG: 25 TABLET ORAL at 08:25

## 2020-08-02 RX ADMIN — INSULIN ASPART 4 UNITS: 100 INJECTION, SOLUTION INTRAVENOUS; SUBCUTANEOUS at 16:41

## 2020-08-02 RX ADMIN — RIFAXIMIN 550 MG: 550 TABLET ORAL at 21:16

## 2020-08-02 RX ADMIN — ATORVASTATIN CALCIUM 80 MG: 40 TABLET, FILM COATED ORAL at 08:26

## 2020-08-02 RX ADMIN — AMITRIPTYLINE HYDROCHLORIDE 25 MG: 25 TABLET, FILM COATED ORAL at 21:16

## 2020-08-02 RX ADMIN — CETIRIZINE HYDROCHLORIDE 10 MG: 10 TABLET, FILM COATED ORAL at 09:54

## 2020-08-02 RX ADMIN — FUROSEMIDE 20 MG: 20 TABLET ORAL at 08:26

## 2020-08-02 RX ADMIN — TRAZODONE HYDROCHLORIDE 150 MG: 50 TABLET ORAL at 21:16

## 2020-08-02 RX ADMIN — CLONAZEPAM 0.5 MG: 0.5 TABLET ORAL at 08:28

## 2020-08-02 RX ADMIN — IBUPROFEN 400 MG: 400 TABLET, FILM COATED ORAL at 05:25

## 2020-08-02 RX ADMIN — CLONAZEPAM 0.5 MG: 0.5 TABLET ORAL at 21:16

## 2020-08-02 RX ADMIN — ASPIRIN 81 MG: 81 TABLET, COATED ORAL at 09:54

## 2020-08-02 RX ADMIN — FLUTICASONE PROPIONATE 1 SPRAY: 50 SPRAY, METERED NASAL at 08:27

## 2020-08-02 RX ADMIN — OXYBUTYNIN CHLORIDE 2.5 MG: 5 TABLET ORAL at 08:26

## 2020-08-02 RX ADMIN — LACTULOSE 20 G: 10 SOLUTION ORAL at 08:25

## 2020-08-02 RX ADMIN — PANTOPRAZOLE SODIUM 40 MG: 40 TABLET, DELAYED RELEASE ORAL at 08:28

## 2020-08-02 RX ADMIN — OXYBUTYNIN CHLORIDE 2.5 MG: 5 TABLET ORAL at 21:16

## 2020-08-02 NOTE — PROGRESS NOTES
"INPATIENT PSYCHIATRIC PROGRESS NOTE    Name:  Eduardo Ryan  :  1965  MRN:  [id number]  Visit Number:  [id number]  Length of stay:  3    SUBJECTIVE  CC/Focus of Exam: Mood disturbance, benzodiazepine dependence    INTERVAL HISTORY:  Patient reports low mood, high anxiety.  Focused on medications, such as benzodiazepines and pain medication.  Continues to want to detox from Xanax but wavering on stopping Norco.  Discussed with him the benefits of limiting opiate use to short, occasional situations.    Depression rating 7/10  Anxiety rating 7/10  Sleep: Poor  Withdrawal sx: Anxiety, pain  Cravin/10    Review of Systems   Constitutional: Negative.    Respiratory: Negative.    Cardiovascular: Negative.    Gastrointestinal: Negative.    Musculoskeletal: Negative.    Psychiatric/Behavioral: Positive for dysphoric mood and sleep disturbance. The patient is nervous/anxious.        OBJECTIVE    Temp:  [97 °F (36.1 °C)-97.8 °F (36.6 °C)] 97.5 °F (36.4 °C)  Heart Rate:  [75-82] 78  Resp:  [16-18] 18  BP: (132-152)/(69-88) 144/88    MENTAL STATUS EXAM:  Appearance: Dressed in hospital scrubs, fair hygeine.   Cooperation:Cooperative  Psychomotor: +psychomotor retardation, No EPS, No motor tics  Speech-normal rate, amount.  Mood \" nervous\"   Affect-congruent, directed  Thought Content-goal directed, no delusional material present  Thought process-linear, organized.  Suicidality: No SI  Homicidality: No HI  Perception: No AH/VH  Insight-fair   Judgement-fair    Lab Results (last 24 hours)     Procedure Component Value Units Date/Time    POC Glucose Once [[id number]]  (Abnormal) Collected:  20 0718    Specimen:  Blood Updated:  20 0726     Glucose 161 mg/dL     POC Glucose Once [[id number]]  (Abnormal) Collected:  20 1613    Specimen:  Blood Updated:  20 1623     Glucose 138 mg/dL              Imaging Results (Last 24 Hours)     Procedure Component Value Units Date/Time    US Venous Doppler " Lower Extremity Bilateral (duplex) [[id number]] Collected:  08/01/20 2001     Updated:  08/01/20 2003    Narrative:       US Veins LE Duplex BILAT    HISTORY:   54-year-old male with chest pain and elevated d-dimer today.    TECHNIQUE:   Real-time ultrasound was performed of both lower extremities utilizing spectral and color Doppler with compression and augmentation techniques.    COMPARISON:  None available.    FINDINGS:  Right Lower Extremity:  There is no deep venous thrombus seen in the right lower extremity, including the right common femoral veins, right femoral veins and right popliteal veins.  Normal compressibility and respiratory phasicity was visualized.  No calf vein thrombus.    Left Lower Extremity:  There is no deep venous thrombus seen in the left lower extremity, including the left common femoral veins, left femoral veins and left popliteal veins.   Normal compressibility and respiratory phasicity was visualized.  No calf vein thrombus.        Impression:       No deep venous thrombus seen in either lower extremity.    Signer Name: Colten Nichols MD   Signed: 8/1/2020 8:01 PM   Workstation Name: [id number]-    [facility]             ECG/EMG Results (most recent)     None           ALLERGIES: Peanut oil and Sulfa antibiotics      Current Facility-Administered Medications:   •  acetaminophen (TYLENOL) tablet 650 mg, 650 mg, Oral, Q6H PRN, Breezy Lemus MD, 650 mg at 08/01/20 0805  •  albuterol (PROVENTIL) nebulizer solution 0.083% 2.5 mg/3mL, 2.5 mg, Nebulization, Q6H PRN, Breezy Lemus MD  •  aluminum-magnesium hydroxide-simethicone (MAALOX MAX) 400-400-40 MG/5ML suspension 15 mL, 15 mL, Oral, Q6H PRN, Breezy Lemus MD  •  amitriptyline (ELAVIL) tablet 25 mg, 25 mg, Oral, Nightly, Breezy Lemus MD, 25 mg at 08/01/20 2041  •  aspirin EC tablet 81 mg, 81 mg, Oral, Daily, Breezy Lemus MD, 81 mg at 08/01/20 0917  •  atorvastatin (LIPITOR) tablet 80 mg, 80  mg, Oral, Daily, Breezy Lemus MD, 80 mg at 20 0826  •  benzonatate (TESSALON) capsule 100 mg, 100 mg, Oral, TID PRN, Breezy Lemus MD  •  benztropine (COGENTIN) tablet 2 mg, 2 mg, Oral, Once PRN **OR** benztropine (COGENTIN) injection 1 mg, 1 mg, Intramuscular, Once PRN, Breezy Lemus MD  •  cetirizine (zyrTEC) tablet 10 mg, 10 mg, Oral, Daily, Breezy Lemus MD, 10 mg at 20 0917  •  clonazePAM (KlonoPIN) tablet 0.5 mg, 0.5 mg, Oral, Q12H, Wilner Arroyo MD, 0.5 mg at 20 0828  •  [] cloNIDine (CATAPRES) tablet 0.1 mg, 0.1 mg, Oral, 4x Daily PRN **FOLLOWED BY** cloNIDine (CATAPRES) tablet 0.1 mg, 0.1 mg, Oral, TID PRN **FOLLOWED BY** [START ON 8/3/2020] cloNIDine (CATAPRES) tablet 0.1 mg, 0.1 mg, Oral, BID PRN **FOLLOWED BY** [START ON 2020] cloNIDine (CATAPRES) tablet 0.1 mg, 0.1 mg, Oral, Daily PRN, Sarah Holder MD  •  cyclobenzaprine (FLEXERIL) tablet 10 mg, 10 mg, Oral, TID PRN, Sarah Holder MD, 10 mg at 20 2201  •  dextrose (D50W) 25 g/ 50mL Intravenous Solution 25 g, 25 g, Intravenous, Q15 Min PRN, Breezy Lemus MD  •  dextrose (GLUTOSE) oral gel 15 g, 15 g, Oral, Q15 Min PRN, Breezy Lemus MD  •  dicyclomine (BENTYL) capsule 10 mg, 10 mg, Oral, TID PRN, Sarah Holder MD  •  famotidine (PEPCID) tablet 20 mg, 20 mg, Oral, BID PRN, Breezy Lemus MD  •  fluticasone (FLONASE) 50 MCG/ACT nasal spray 1 spray, 1 spray, Nasal, Daily, Breezy Lemus MD, 1 spray at 20 08  •  furosemide (LASIX) tablet 20 mg, 20 mg, Oral, Daily, Breezy Lemus MD, 20 mg at 20 08  •  glucagon (human recombinant) (GLUCAGEN DIAGNOSTIC) injection 1 mg, 1 mg, Subcutaneous, Q15 Min PRN, Breezy Lemus MD  •  hydrOXYzine (ATARAX) tablet 50 mg, 50 mg, Oral, Q6H PRN, Breezy Lemus MD, 50 mg at 20 0009  •  ibuprofen (ADVIL,MOTRIN) tablet 400 mg, 400 mg, Oral, Q6H PRN, Breezy Lemus MD, 400 mg at 20 0525  •   insulin aspart (novoLOG) injection 0-7 Units, 0-7 Units, Subcutaneous, TID AC, Breezy Lemus MD, 2 Units at 20 0756  •  ipratropium (ATROVENT) nebulizer solution 0.5 mg, 500 mcg, Nebulization, 4x Daily PRN, Breezy Lemus MD  •  lactulose (CHRONULAC) 10 GM/15ML solution 20 g, 20 g, Oral, Daily, Breezy Lemus MD, 20 g at 20 0825  •  lisinopril (PRINIVIL,ZESTRIL) tablet 10 mg, 10 mg, Oral, Daily, Breezy Lemus MD, 10 mg at 20 0804  •  loperamide (IMODIUM) capsule 2 mg, 2 mg, Oral, Q2H PRN, Breezy Lemus MD  •  [] LORazepam (ATIVAN) tablet 2 mg, 2 mg, Oral, Q4H PRN, 2 mg at 20 1540 **FOLLOWED BY** LORazepam (ATIVAN) tablet 1.5 mg, 1.5 mg, Oral, Q4H PRN **FOLLOWED BY** [START ON 8/3/2020] LORazepam (ATIVAN) tablet 1 mg, 1 mg, Oral, Q4H PRN **FOLLOWED BY** [START ON 2020] LORazepam (ATIVAN) tablet 0.5 mg, 0.5 mg, Oral, Q4H PRN, Sarah Holder MD  •  magnesium hydroxide (MILK OF MAGNESIA) suspension 2400 mg/10mL 10 mL, 10 mL, Oral, Daily PRN, Breezy Lemus MD  •  nadolol (CORGARD) tablet 20 mg, 20 mg, Oral, Daily, Breezy Lemus MD, 20 mg at 20 0825  •  nitroglycerin (NITROSTAT) SL tablet 0.4 mg, 0.4 mg, Sublingual, Q5 Min PRN, Breezy Lemus MD  •  ondansetron (ZOFRAN) tablet 4 mg, 4 mg, Oral, Q6H PRN, Breezy Lemus MD  •  oxybutynin (DITROPAN) tablet 2.5 mg, 2.5 mg, Oral, BID, Breezy Lemus MD, 2.5 mg at 20  •  pantoprazole (PROTONIX) EC tablet 40 mg, 40 mg, Oral, Daily, Breezy Lemus MD, 40 mg at 20  •  potassium chloride (K-DUR,KLOR-CON) CR tablet 20 mEq, 20 mEq, Oral, Daily, Breezy Lemus MD, 20 mEq at 20  •  riFAXIMin (XIFAXAN) tablet 550 mg, 550 mg, Oral, BID, Breezy Lemus MD, 550 mg at 20  •  sodium chloride nasal spray 2 spray, 2 spray, Each Nare, PRN, Breezy Lemus MD  •  spironolactone (ALDACTONE) tablet 50 mg, 50 mg, Oral, Daily, Breezy Lemus MD, 50 mg at  08/02/20 0825  •  traZODone (DESYREL) tablet 150 mg, 150 mg, Oral, Nightly, Wilner Arroyo MD, 150 mg at 08/01/20 2041    First time seeing patient.  Reviewed chart, notes, vitals, labs and EKG personally    ASSESSMENT & PLAN:      MDD (major depressive disorder)    Major depressive disorder, moderate, recurrent  -Patient with mood disturbance, anxiety, reported decompensation by wife of declining functional status.  Admit for crisis stabilization  -Patient requests not to add or increase medications for depression at this time  -Ascertain outpatient care    Benzodiazepine dependence  -Patient requesting to detox off of Xanax  -Placed on brief clonazepam taper  -Monitor with CIWA    Opioid dependence  -Patient wavering on stopping Norco, saying he has persistent back pain.  However he is done well without any of the medication since admission  -Continue clonidine protocol    Elevated d-dimer  -CT PE protocol negative  -Ultrasound for DVT negative    Diabetes mellitus  -Metformin currently held due to elevated lactic acid  -Continue Accu-Cheks    Hypertension  -Continue lisinopril    Chronic liver disease  -Continue nadolol, rifaximin, spironolactone and Lasix    COVID-19 exposure  -Patient reported exposure to a family member with potential COVID-19 infection  -Hospitalist was consulted.  Appreciate involvement    Special precautions: Special Precautions Level 3 (q15 min checks)     Behavioral Health Treatment Plan and Problem List: I have reviewed and approved the Behavioral Health Treatment Plan and Problem list.  The patient has had a chance to review and agrees with the treatment plan.     Clinician:  Wilner Arroyo MD  08/02/20  09:50

## 2020-08-02 NOTE — PLAN OF CARE
Patient rates anxiety 6 and depression 5-6, denies thoughts of harming self and others, and denies hallucinations.

## 2020-08-02 NOTE — PLAN OF CARE
Patient rates anxiety 5-6 and depression 6-7, denies thoughts of harming self and others, and denies hallucinations.

## 2020-08-02 NOTE — PROGRESS NOTES
"INPATIENT PSYCHIATRIC PROGRESS NOTE    Name:  Eduardo Ryan  :  1965  MRN:  [id number]  Visit Number:  [id number]  Length of stay:  3    SUBJECTIVE  CC/Focus of Exam: Mood disturbance, benzodiazepine dependence    INTERVAL HISTORY:  Patient reports feeling okay overall.  Mood and anxiety are stable.  Tolerating detox from Xanax and [place].  Requiring very few as needed medications for withdrawal.  Still uncertain about stopping Norco but has not required any PRNs since medication was discontinued.  Asked about returning home in the next day or 2    Depression rating 4/10  Anxiety rating 4/10  Sleep: Better  Withdrawal sx: Anxiety  Cravin/10    Review of Systems   Constitutional: Negative.    Respiratory: Negative.    Cardiovascular: Negative.    Gastrointestinal: Negative.    Musculoskeletal: Negative.    Psychiatric/Behavioral: Positive for dysphoric mood. The patient is nervous/anxious.        OBJECTIVE    Temp:  [97 °F (36.1 °C)-97.8 °F (36.6 °C)] 97.5 °F (36.4 °C)  Heart Rate:  [75-82] 78  Resp:  [16-18] 18  BP: (132-152)/(69-88) 144/88    MENTAL STATUS EXAM:  Appearance: Dressed in hospital scrubs, fair hygeine.   Cooperation:Cooperative  Psychomotor: Improving psychomotor retardation, No EPS, No motor tics  Speech-normal rate, amount.  Mood \"feeling a little better\"   Affect-congruent, directed  Thought Content-goal directed, no delusional material present  Thought process-linear, organized.  Suicidality: No SI  Homicidality: No HI  Perception: No AH/VH  Insight-fair   Judgement-fair    Lab Results (last 24 hours)     Procedure Component Value Units Date/Time    POC Glucose Once [[id number]]  (Abnormal) Collected:  20 0718    Specimen:  Blood Updated:  20 0726     Glucose 161 mg/dL     POC Glucose Once [[id number]]  (Abnormal) Collected:  20 1613    Specimen:  Blood Updated:  20 1623     Glucose 138 mg/dL              Imaging Results (Last 24 Hours)     Procedure Component " Value Units Date/Time    US Venous Doppler Lower Extremity Bilateral (duplex) [[id number]] Collected:  08/01/20 2001     Updated:  08/01/20 2003    Narrative:       US Veins LE Duplex BILAT    HISTORY:   54-year-old male with chest pain and elevated d-dimer today.    TECHNIQUE:   Real-time ultrasound was performed of both lower extremities utilizing spectral and color Doppler with compression and augmentation techniques.    COMPARISON:  None available.    FINDINGS:  Right Lower Extremity:  There is no deep venous thrombus seen in the right lower extremity, including the right common femoral veins, right femoral veins and right popliteal veins.  Normal compressibility and respiratory phasicity was visualized.  No calf vein thrombus.    Left Lower Extremity:  There is no deep venous thrombus seen in the left lower extremity, including the left common femoral veins, left femoral veins and left popliteal veins.   Normal compressibility and respiratory phasicity was visualized.  No calf vein thrombus.        Impression:       No deep venous thrombus seen in either lower extremity.    Signer Name: Colten Nichols MD   Signed: 8/1/2020 8:01 PM   Workstation Name: [id number]    [facility]             ECG/EMG Results (most recent)     None           ALLERGIES: Peanut oil and Sulfa antibiotics      Current Facility-Administered Medications:   •  acetaminophen (TYLENOL) tablet 650 mg, 650 mg, Oral, Q6H PRN, Breezy Lemus MD, 650 mg at 08/01/20 0805  •  albuterol (PROVENTIL) nebulizer solution 0.083% 2.5 mg/3mL, 2.5 mg, Nebulization, Q6H PRN, Breezy Lemus MD  •  aluminum-magnesium hydroxide-simethicone (MAALOX MAX) 400-400-40 MG/5ML suspension 15 mL, 15 mL, Oral, Q6H PRN, Breezy Lemus MD  •  amitriptyline (ELAVIL) tablet 25 mg, 25 mg, Oral, Nightly, Breezy Lemus MD, 25 mg at 08/01/20 2041  •  aspirin EC tablet 81 mg, 81 mg, Oral, Daily, Breezy Lemus MD, 81 mg at 08/02/20 0954  •   atorvastatin (LIPITOR) tablet 80 mg, 80 mg, Oral, Daily, Breezy Lemus MD, 80 mg at 20 0826  •  benzonatate (TESSALON) capsule 100 mg, 100 mg, Oral, TID PRN, Breezy Lemus MD  •  benztropine (COGENTIN) tablet 2 mg, 2 mg, Oral, Once PRN **OR** benztropine (COGENTIN) injection 1 mg, 1 mg, Intramuscular, Once PRN, Breezy Lemus MD  •  cetirizine (zyrTEC) tablet 10 mg, 10 mg, Oral, Daily, Breezy Lemus MD, 10 mg at 20 0954  •  clonazePAM (KlonoPIN) tablet 0.5 mg, 0.5 mg, Oral, Q12H, Wilner Arroyo MD, 0.5 mg at 20 0828  •  [] cloNIDine (CATAPRES) tablet 0.1 mg, 0.1 mg, Oral, 4x Daily PRN **FOLLOWED BY** cloNIDine (CATAPRES) tablet 0.1 mg, 0.1 mg, Oral, TID PRN **FOLLOWED BY** [START ON 8/3/2020] cloNIDine (CATAPRES) tablet 0.1 mg, 0.1 mg, Oral, BID PRN **FOLLOWED BY** [START ON 2020] cloNIDine (CATAPRES) tablet 0.1 mg, 0.1 mg, Oral, Daily PRN, Sarah Holder MD  •  cyclobenzaprine (FLEXERIL) tablet 10 mg, 10 mg, Oral, TID PRN, Sarah Holder MD, 10 mg at 20 2201  •  dextrose (D50W) 25 g/ 50mL Intravenous Solution 25 g, 25 g, Intravenous, Q15 Min PRN, Breezy Lemus MD  •  dextrose (GLUTOSE) oral gel 15 g, 15 g, Oral, Q15 Min PRN, Breezy Lemus MD  •  dicyclomine (BENTYL) capsule 10 mg, 10 mg, Oral, TID PRN, Sarah Holder MD  •  famotidine (PEPCID) tablet 20 mg, 20 mg, Oral, BID PRN, Breezy Lemus MD  •  fluticasone (FLONASE) 50 MCG/ACT nasal spray 1 spray, 1 spray, Nasal, Daily, Breezy Lemus MD, 1 spray at 20 08  •  furosemide (LASIX) tablet 20 mg, 20 mg, Oral, Daily, Breezy Lemus MD, 20 mg at 20 08  •  glucagon (human recombinant) (GLUCAGEN DIAGNOSTIC) injection 1 mg, 1 mg, Subcutaneous, Q15 Min PRN, Breezy Lemus MD  •  hydrOXYzine (ATARAX) tablet 50 mg, 50 mg, Oral, Q6H PRN, Breezy Lemus MD, 50 mg at 20 0009  •  ibuprofen (ADVIL,MOTRIN) tablet 400 mg, 400 mg, Oral, Q6H PRN, Breezy Lemus  MD COMPA, 400 mg at 20 0525  •  insulin aspart (novoLOG) injection 0-7 Units, 0-7 Units, Subcutaneous, TID AC, Breezy Lemus MD, 2 Units at 20 0756  •  ipratropium (ATROVENT) nebulizer solution 0.5 mg, 500 mcg, Nebulization, 4x Daily PRN, Breezy Lemus MD  •  lactulose (CHRONULAC) 10 GM/15ML solution 20 g, 20 g, Oral, Daily, Breezy Lemus MD, 20 g at 20 0825  •  lisinopril (PRINIVIL,ZESTRIL) tablet 10 mg, 10 mg, Oral, Daily, Breezy Lemus MD, 10 mg at 20 0954  •  loperamide (IMODIUM) capsule 2 mg, 2 mg, Oral, Q2H PRN, Breezy Lemus MD  •  [] LORazepam (ATIVAN) tablet 2 mg, 2 mg, Oral, Q4H PRN, 2 mg at 20 1540 **FOLLOWED BY** LORazepam (ATIVAN) tablet 1.5 mg, 1.5 mg, Oral, Q4H PRN **FOLLOWED BY** [START ON 8/3/2020] LORazepam (ATIVAN) tablet 1 mg, 1 mg, Oral, Q4H PRN **FOLLOWED BY** [START ON 2020] LORazepam (ATIVAN) tablet 0.5 mg, 0.5 mg, Oral, Q4H PRN, Sarah Holder MD  •  magnesium hydroxide (MILK OF MAGNESIA) suspension 2400 mg/10mL 10 mL, 10 mL, Oral, Daily PRN, Breezy Lemus MD  •  nadolol (CORGARD) tablet 20 mg, 20 mg, Oral, Daily, Breezy Lemus MD, 20 mg at 20 0825  •  nitroglycerin (NITROSTAT) SL tablet 0.4 mg, 0.4 mg, Sublingual, Q5 Min PRN, Breezy Lemus MD  •  ondansetron (ZOFRAN) tablet 4 mg, 4 mg, Oral, Q6H PRN, Breezy Lemus MD  •  oxybutynin (DITROPAN) tablet 2.5 mg, 2.5 mg, Oral, BID, Breezy Lemus MD, 2.5 mg at 20  •  pantoprazole (PROTONIX) EC tablet 40 mg, 40 mg, Oral, Daily, Breezy Lemus MD, 40 mg at 20  •  potassium chloride (K-DUR,KLOR-CON) CR tablet 20 mEq, 20 mEq, Oral, Daily, Breezy Lemus MD, 20 mEq at 20  •  riFAXIMin (XIFAXAN) tablet 550 mg, 550 mg, Oral, BID, Breezy Lemus MD, 550 mg at 20  •  sodium chloride nasal spray 2 spray, 2 spray, Each Nare, PRN, Breezy Lemus MD  •  spironolactone (ALDACTONE) tablet 50 mg, 50 mg, Oral, Daily,  Breezy Lemus MD, 50 mg at 08/02/20 0825  •  traZODone (DESYREL) tablet 150 mg, 150 mg, Oral, Nightly, Wilner Arroyo MD, 150 mg at 08/01/20 2041    Reviewed chart, notes, vitals, labs and EKG personally    ASSESSMENT & PLAN:      MDD (major depressive disorder)    Major depressive disorder, moderate, recurrent  -Patient with mood disturbance, anxiety, reported decompensation by wife of declining functional status.  Admit for crisis stabilization  -Patient requests not to add or increase medications for depression at this time  -Ascertain outpatient care  -Patient asked about discharge in the next 1 to 2 days    Benzodiazepine dependence  -Patient requesting to detox off of Xanax  -Placed on brief clonazepam taper with PRN's available but tolerating the protocol well  -Monitor with CIWA    Opioid dependence  -Patient wavering on stopping Norco, saying he has persistent back pain.  However he is done well without any of the medication since admission  -Continue clonidine protocol.  Required minimal comfort medication thus far    Elevated d-dimer  -CT PE protocol negative  -Ultrasound for DVT negative    Diabetes mellitus  -Metformin currently held due to elevated lactic acid  -Continue Accu-Cheks    Hypertension  -Continue lisinopril    Chronic liver disease  -Continue nadolol, rifaximin, spironolactone and Lasix    COVID-19 exposure  -Patient reported exposure to a family member with potential COVID-19 infection  -Hospitalist was consulted.  Appreciate involvement    Special precautions: Special Precautions Level 3 (q15 min checks)     Behavioral Health Treatment Plan and Problem List: I have reviewed and approved the Behavioral Health Treatment Plan and Problem list.  The patient has had a chance to review and agrees with the treatment plan.     Clinician:  Wilner Arroyo MD  08/02/20  10:00

## 2020-08-02 NOTE — PLAN OF CARE
Problem: Patient Care Overview  Goal: Plan of Care Review  Outcome: Ongoing (interventions implemented as appropriate)  Flowsheets (Taken 8/2/2020 0440)  Plan of Care Reviewed With: patient  Patient Agreement with Plan of Care: agrees  Note:   Pt up in day room this evening. COW score 2, CIWA score 5. Denies anxiety, depression and SI. Has slept well.

## 2020-08-03 LAB
BASOPHILS # BLD AUTO: 0.03 10*3/MM3 (ref 0–0.2)
BASOPHILS NFR BLD AUTO: 1 % (ref 0–1.5)
DEPRECATED RDW RBC AUTO: 48.9 FL (ref 37–54)
EOSINOPHIL # BLD AUTO: 0.11 10*3/MM3 (ref 0–0.4)
EOSINOPHIL NFR BLD AUTO: 3.5 % (ref 0.3–6.2)
ERYTHROCYTE [DISTWIDTH] IN BLOOD BY AUTOMATED COUNT: 15.3 % (ref 12.3–15.4)
GLUCOSE BLDC GLUCOMTR-MCNC: 114 MG/DL (ref 70–130)
GLUCOSE BLDC GLUCOMTR-MCNC: 152 MG/DL (ref 70–130)
GLUCOSE BLDC GLUCOMTR-MCNC: 157 MG/DL (ref 70–130)
GLUCOSE BLDC GLUCOMTR-MCNC: 217 MG/DL (ref 70–130)
HCT VFR BLD AUTO: 45 % (ref 37.5–51)
HGB BLD-MCNC: 14.2 G/DL (ref 13–17.7)
IMM GRANULOCYTES # BLD AUTO: 0 10*3/MM3 (ref 0–0.05)
IMM GRANULOCYTES NFR BLD AUTO: 0 % (ref 0–0.5)
LYMPHOCYTES # BLD AUTO: 1.11 10*3/MM3 (ref 0.7–3.1)
LYMPHOCYTES NFR BLD AUTO: 35.4 % (ref 19.6–45.3)
MCH RBC QN AUTO: 27.7 PG (ref 26.6–33)
MCHC RBC AUTO-ENTMCNC: 31.6 G/DL (ref 31.5–35.7)
MCV RBC AUTO: 87.9 FL (ref 79–97)
MONOCYTES # BLD AUTO: 0.28 10*3/MM3 (ref 0.1–0.9)
MONOCYTES NFR BLD AUTO: 8.9 % (ref 5–12)
NEUTROPHILS NFR BLD AUTO: 1.61 10*3/MM3 (ref 1.7–7)
NEUTROPHILS NFR BLD AUTO: 51.2 % (ref 42.7–76)
NRBC BLD AUTO-RTO: 0 /100 WBC (ref 0–0.2)
PLATELET # BLD AUTO: 77 10*3/MM3 (ref 140–450)
PMV BLD AUTO: 10.2 FL (ref 6–12)
RBC # BLD AUTO: 5.12 10*6/MM3 (ref 4.14–5.8)
WBC # BLD AUTO: 3.14 10*3/MM3 (ref 3.4–10.8)

## 2020-08-03 PROCEDURE — 85025 COMPLETE CBC W/AUTO DIFF WBC: CPT | Performed by: INTERNAL MEDICINE

## 2020-08-03 PROCEDURE — 63710000001 INSULIN ASPART PER 5 UNITS: Performed by: PSYCHIATRY & NEUROLOGY

## 2020-08-03 PROCEDURE — G0480 DRUG TEST DEF 1-7 CLASSES: HCPCS | Performed by: PSYCHIATRY & NEUROLOGY

## 2020-08-03 PROCEDURE — 82962 GLUCOSE BLOOD TEST: CPT

## 2020-08-03 PROCEDURE — 94799 UNLISTED PULMONARY SVC/PX: CPT

## 2020-08-03 RX ADMIN — SPIRONOLACTONE 50 MG: 25 TABLET ORAL at 08:41

## 2020-08-03 RX ADMIN — IBUPROFEN 400 MG: 400 TABLET, FILM COATED ORAL at 04:07

## 2020-08-03 RX ADMIN — INSULIN ASPART 3 UNITS: 100 INJECTION, SOLUTION INTRAVENOUS; SUBCUTANEOUS at 10:50

## 2020-08-03 RX ADMIN — RIFAXIMIN 550 MG: 550 TABLET ORAL at 08:41

## 2020-08-03 RX ADMIN — RIFAXIMIN 550 MG: 550 TABLET ORAL at 22:04

## 2020-08-03 RX ADMIN — CLONAZEPAM 0.5 MG: 0.5 TABLET ORAL at 08:43

## 2020-08-03 RX ADMIN — TRAZODONE HYDROCHLORIDE 150 MG: 50 TABLET ORAL at 22:04

## 2020-08-03 RX ADMIN — LACTULOSE 20 G: 10 SOLUTION ORAL at 08:42

## 2020-08-03 RX ADMIN — NADOLOL 20 MG: 40 TABLET ORAL at 08:41

## 2020-08-03 RX ADMIN — OXYBUTYNIN CHLORIDE 2.5 MG: 5 TABLET ORAL at 22:04

## 2020-08-03 RX ADMIN — FUROSEMIDE 20 MG: 20 TABLET ORAL at 08:41

## 2020-08-03 RX ADMIN — FLUTICASONE PROPIONATE 1 SPRAY: 50 SPRAY, METERED NASAL at 08:43

## 2020-08-03 RX ADMIN — LISINOPRIL 10 MG: 10 TABLET ORAL at 08:41

## 2020-08-03 RX ADMIN — ASPIRIN 81 MG: 81 TABLET, COATED ORAL at 08:41

## 2020-08-03 RX ADMIN — PANTOPRAZOLE SODIUM 40 MG: 40 TABLET, DELAYED RELEASE ORAL at 08:43

## 2020-08-03 RX ADMIN — AMITRIPTYLINE HYDROCHLORIDE 25 MG: 25 TABLET, FILM COATED ORAL at 22:03

## 2020-08-03 RX ADMIN — CETIRIZINE HYDROCHLORIDE 10 MG: 10 TABLET, FILM COATED ORAL at 08:41

## 2020-08-03 RX ADMIN — INSULIN ASPART 2 UNITS: 100 INJECTION, SOLUTION INTRAVENOUS; SUBCUTANEOUS at 16:30

## 2020-08-03 RX ADMIN — ATORVASTATIN CALCIUM 80 MG: 40 TABLET, FILM COATED ORAL at 08:41

## 2020-08-03 RX ADMIN — POTASSIUM CHLORIDE 20 MEQ: 1500 TABLET, EXTENDED RELEASE ORAL at 08:43

## 2020-08-03 RX ADMIN — OXYBUTYNIN CHLORIDE 2.5 MG: 5 TABLET ORAL at 08:41

## 2020-08-03 NOTE — PROGRESS NOTES
Patient was not seen today.  I reviewed his chart and discussed with the nursing staff who reported no issues.  Patient elevated d-dimer but CTA chest and lower extremity Doppler is negative for PE.  His COVID 19 was not detected.  Patient has thrombocytopenia and leukopenia due to his underlying chronic liver disease and will advised to monitor his CBC.  Currently patient has no evidence of bleeding and therefore no intervention is needed.  We will recommend to continue nadolol, rifaximin and spironolactone and Lasix for his chronic liver disease.  Hospital service will sign off at this time.  Please feel free to call in case of any other questions.

## 2020-08-03 NOTE — NURSING NOTE
Notified Tere to call Dr. Holder per MD nursing communication request. Waiting on a response at this time. Will continue to monitor.

## 2020-08-03 NOTE — PLAN OF CARE
Problem: Patient Care Overview  Goal: Plan of Care Review  Outcome: Ongoing (interventions implemented as appropriate)  Flowsheets  Taken 8/2/2020 2141  Progress: no change  Plan of Care Reviewed With: patient  Taken 8/2/2020 2138  Patient Agreement with Plan of Care: agrees  Note:   Patient rates anxiety 7/10 and depression 8/10. He denies si, hi, and avh. He also denies any w/d symptoms or cravings. Patient is calm and cooperative.

## 2020-08-03 NOTE — PROGRESS NOTES
LOS: 4 days   Patient Care Team:  Regan Cantu MD as PCP - General (Family Medicine)    Chief Complaint: Patient complains he is depressed because he is missing his wife, next month is his 25th wedding anniversary.  He denies that he is nervous, rates his anxiety at 2 or 3.  He has slept fair about 4 to 5 hours.  He did not have any nightmares last night.  Night before last he had a brief nightmare when he was missing his  parents.  He denies craving for any drugs and denies any withdrawal symptoms other than occasional headache.  He thinks he needs to stay in a different part of the hospital because of his backache which is chronic.      Interval History:       Interval Review of Systems:   General backache he rates it at 6  Respiratory negative  Cardiovascular negative  Gastrointestinal negative    Vital Signs    Temp:  [96.8 °F (36 °C)-97.4 °F (36.3 °C)] 96.8 °F (36 °C)  Heart Rate:  [61-90] 61  Resp:  [18] 18  BP: (113-155)/(67-72) 155/71    Lab Results:   Lab Results (last 24 hours)     Procedure Component Value Units Date/Time    POC Glucose Once [[id number]]  (Normal) Collected:  20    Specimen:  Blood Updated:  20     Glucose 114 mg/dL     CBC & Differential [[id number]] Collected:  20    Specimen:  Blood Updated:  20    Narrative:       The following orders were created for panel order CBC & Differential.  Procedure                               Abnormality         Status                     ---------                               -----------         ------                     CBC Auto Differential[[id number]]        Abnormal            Final result                 Please view results for these tests on the individual orders.    CBC Auto Differential [[id number]]  (Abnormal) Collected:  20    Specimen:  Blood Updated:  20     WBC 3.14 10*3/mm3      RBC 5.12 10*6/mm3      Hemoglobin 14.2 g/dL      Hematocrit 45.0 %      MCV 87.9  fL      MCH 27.7 pg      MCHC 31.6 g/dL      RDW 15.3 %      RDW-SD 48.9 fl      MPV 10.2 fL      Platelets 77 10*3/mm3      Neutrophil % 51.2 %      Lymphocyte % 35.4 %      Monocyte % 8.9 %      Eosinophil % 3.5 %      Basophil % 1.0 %      Immature Grans % 0.0 %      Neutrophils, Absolute 1.61 10*3/mm3      Lymphocytes, Absolute 1.11 10*3/mm3      Monocytes, Absolute 0.28 10*3/mm3      Eosinophils, Absolute 0.11 10*3/mm3      Basophils, Absolute 0.03 10*3/mm3      Immature Grans, Absolute 0.00 10*3/mm3      nRBC 0.0 /100 WBC     POC Glucose Once [[id number]]  (Abnormal) Collected:  08/02/20 1600    Specimen:  Blood Updated:  08/02/20 1616     Glucose 255 mg/dL     POC Glucose Once [[id number]]  (Abnormal) Collected:  08/02/20 1115    Specimen:  Blood Updated:  08/02/20 1131     Glucose 134 mg/dL            Labs:     Lab Results (last 24 hours)     Procedure Component Value Units Date/Time    POC Glucose Once [[id number]]  (Normal) Collected:  08/03/20 0630    Specimen:  Blood Updated:  08/03/20 0638     Glucose 114 mg/dL     CBC & Differential [[id number]] Collected:  08/03/20 0519    Specimen:  Blood Updated:  08/03/20 0533    Narrative:       The following orders were created for panel order CBC & Differential.  Procedure                               Abnormality         Status                     ---------                               -----------         ------                     CBC Auto Differential[[id number]]        Abnormal            Final result                 Please view results for these tests on the individual orders.    CBC Auto Differential [[id number]]  (Abnormal) Collected:  08/03/20 0519    Specimen:  Blood Updated:  08/03/20 0533     WBC 3.14 10*3/mm3      RBC 5.12 10*6/mm3      Hemoglobin 14.2 g/dL      Hematocrit 45.0 %      MCV 87.9 fL      MCH 27.7 pg      MCHC 31.6 g/dL      RDW 15.3 %      RDW-SD 48.9 fl      MPV 10.2 fL      Platelets 77 10*3/mm3      Neutrophil % 51.2 %      Lymphocyte %  35.4 %      Monocyte % 8.9 %      Eosinophil % 3.5 %      Basophil % 1.0 %      Immature Grans % 0.0 %      Neutrophils, Absolute 1.61 10*3/mm3      Lymphocytes, Absolute 1.11 10*3/mm3      Monocytes, Absolute 0.28 10*3/mm3      Eosinophils, Absolute 0.11 10*3/mm3      Basophils, Absolute 0.03 10*3/mm3      Immature Grans, Absolute 0.00 10*3/mm3      nRBC 0.0 /100 WBC     POC Glucose Once [[id number]]  (Abnormal) Collected:  08/02/20 1600    Specimen:  Blood Updated:  08/02/20 1616     Glucose 255 mg/dL     POC Glucose Once [[id number]]  (Abnormal) Collected:  08/02/20 1115    Specimen:  Blood Updated:  08/02/20 1131     Glucose 134 mg/dL                         Exam:    Mental Status Exam:     Hygiene:   fair  Cooperation:  Cooperative  Eye Contact:  Good  Psychomotor Behavior:    Affect:  Appropriate  Speech:  Normal  Thought Progress:  Goal directed  Thought Content:  Mood congruent  Suicidal:  None  Homicidal:  None  Hallucinations:  None  Delusion:  None  Orientation:  Person, Place and Time  Reliability:  fair  Insight:  Fair  Judgement:  Fair  Impulse Control:      Results Review:    Lab Results (last 24 hours)     Procedure Component Value Units Date/Time    POC Glucose Once [[id number]]  (Normal) Collected:  08/03/20 0630    Specimen:  Blood Updated:  08/03/20 0638     Glucose 114 mg/dL     CBC & Differential [[id number]] Collected:  08/03/20 0519    Specimen:  Blood Updated:  08/03/20 0533    Narrative:       The following orders were created for panel order CBC & Differential.  Procedure                               Abnormality         Status                     ---------                               -----------         ------                     CBC Auto Differential[[id number]]        Abnormal            Final result                 Please view results for these tests on the individual orders.    CBC Auto Differential [[id number]]  (Abnormal) Collected:  08/03/20 0519    Specimen:  Blood Updated:  08/03/20  0533     WBC 3.14 10*3/mm3      RBC 5.12 10*6/mm3      Hemoglobin 14.2 g/dL      Hematocrit 45.0 %      MCV 87.9 fL      MCH 27.7 pg      MCHC 31.6 g/dL      RDW 15.3 %      RDW-SD 48.9 fl      MPV 10.2 fL      Platelets 77 10*3/mm3      Neutrophil % 51.2 %      Lymphocyte % 35.4 %      Monocyte % 8.9 %      Eosinophil % 3.5 %      Basophil % 1.0 %      Immature Grans % 0.0 %      Neutrophils, Absolute 1.61 10*3/mm3      Lymphocytes, Absolute 1.11 10*3/mm3      Monocytes, Absolute 0.28 10*3/mm3      Eosinophils, Absolute 0.11 10*3/mm3      Basophils, Absolute 0.03 10*3/mm3      Immature Grans, Absolute 0.00 10*3/mm3      nRBC 0.0 /100 WBC     POC Glucose Once [[id number]]  (Abnormal) Collected:  08/02/20 1600    Specimen:  Blood Updated:  08/02/20 1616     Glucose 255 mg/dL     POC Glucose Once [[id number]]  (Abnormal) Collected:  08/02/20 1115    Specimen:  Blood Updated:  08/02/20 1131     Glucose 134 mg/dL           Medication Review:    Current Facility-Administered Medications:   •  acetaminophen (TYLENOL) tablet 650 mg, 650 mg, Oral, Q6H PRN, Breezy Lemus MD, 650 mg at 08/01/20 0805  •  albuterol (PROVENTIL) nebulizer solution 0.083% 2.5 mg/3mL, 2.5 mg, Nebulization, Q6H PRN, Breezy Lemus MD  •  aluminum-magnesium hydroxide-simethicone (MAALOX MAX) 400-400-40 MG/5ML suspension 15 mL, 15 mL, Oral, Q6H PRN, Breezy Lemus MD  •  amitriptyline (ELAVIL) tablet 25 mg, 25 mg, Oral, Nightly, Breezy Lemus MD, 25 mg at 08/02/20 2116  •  aspirin EC tablet 81 mg, 81 mg, Oral, Daily, Breezy Lemus MD, 81 mg at 08/03/20 0841  •  atorvastatin (LIPITOR) tablet 80 mg, 80 mg, Oral, Daily, Breezy Lemus MD, 80 mg at 08/03/20 0841  •  benzonatate (TESSALON) capsule 100 mg, 100 mg, Oral, TID PRN, Breezy Lemus MD  •  benztropine (COGENTIN) tablet 2 mg, 2 mg, Oral, Once PRN **OR** benztropine (COGENTIN) injection 1 mg, 1 mg, Intramuscular, Once PRN, Breezy Lemus MD  •  cetirizine (zyrTEC) tablet 10  mg, 10 mg, Oral, Daily, Breezy Lemus MD, 10 mg at 20 0841  •  [] cloNIDine (CATAPRES) tablet 0.1 mg, 0.1 mg, Oral, 4x Daily PRN **FOLLOWED BY** [] cloNIDine (CATAPRES) tablet 0.1 mg, 0.1 mg, Oral, TID PRN **FOLLOWED BY** cloNIDine (CATAPRES) tablet 0.1 mg, 0.1 mg, Oral, BID PRN **FOLLOWED BY** [START ON 2020] cloNIDine (CATAPRES) tablet 0.1 mg, 0.1 mg, Oral, Daily PRN, Sarah Holder MD  •  cyclobenzaprine (FLEXERIL) tablet 10 mg, 10 mg, Oral, TID PRN, Sarah Holder MD, 10 mg at 20  •  dextrose (D50W) 25 g/ 50mL Intravenous Solution 25 g, 25 g, Intravenous, Q15 Min PRN, Breezy Lemus MD  •  dextrose (GLUTOSE) oral gel 15 g, 15 g, Oral, Q15 Min PRN, Breezy Lemus MD  •  dicyclomine (BENTYL) capsule 10 mg, 10 mg, Oral, TID PRN, Sarah Holder MD  •  famotidine (PEPCID) tablet 20 mg, 20 mg, Oral, BID PRN, Breezy Lemus MD  •  fluticasone (FLONASE) 50 MCG/ACT nasal spray 1 spray, 1 spray, Nasal, Daily, Breezy Lemus MD, 1 spray at 20 0843  •  furosemide (LASIX) tablet 20 mg, 20 mg, Oral, Daily, Breezy Lemus MD, 20 mg at 20 0841  •  glucagon (human recombinant) (GLUCAGEN DIAGNOSTIC) injection 1 mg, 1 mg, Subcutaneous, Q15 Min PRN, Breezy Lemus MD  •  hydrOXYzine (ATARAX) tablet 50 mg, 50 mg, Oral, Q6H PRN, Breezy Lemus MD, 50 mg at 20 0009  •  ibuprofen (ADVIL,MOTRIN) tablet 400 mg, 400 mg, Oral, Q6H PRN, Breezy eLmus MD, 400 mg at 20 0407  •  insulin aspart (novoLOG) injection 0-7 Units, 0-7 Units, Subcutaneous, TID AC, Breezy Lemus MD, 4 Units at 20 1641  •  ipratropium (ATROVENT) nebulizer solution 0.5 mg, 500 mcg, Nebulization, 4x Daily PRN, Breezy Lemus MD  •  lactulose (CHRONULAC) 10 GM/15ML solution 20 g, 20 g, Oral, Daily, Breezy Lemus MD, 20 g at 20 0842  •  lisinopril (PRINIVIL,ZESTRIL) tablet 10 mg, 10 mg, Oral, Daily, Breezy Lemus MD, 10 mg at 20  0841  •  loperamide (IMODIUM) capsule 2 mg, 2 mg, Oral, Q2H PRN, Breezy Lemus MD  •  magnesium hydroxide (MILK OF MAGNESIA) suspension 2400 mg/10mL 10 mL, 10 mL, Oral, Daily PRN, Breezy Lemus MD  •  nadolol (CORGARD) tablet 20 mg, 20 mg, Oral, Daily, Breezy Lemus MD, 20 mg at 08/03/20 0841  •  nitroglycerin (NITROSTAT) SL tablet 0.4 mg, 0.4 mg, Sublingual, Q5 Min PRN, Breezy Lemus MD  •  ondansetron (ZOFRAN) tablet 4 mg, 4 mg, Oral, Q6H PRN, Breezy Lemus MD  •  oxybutynin (DITROPAN) tablet 2.5 mg, 2.5 mg, Oral, BID, Breezy Lemus MD, 2.5 mg at 08/03/20 0841  •  pantoprazole (PROTONIX) EC tablet 40 mg, 40 mg, Oral, Daily, Breezy Lemus MD, 40 mg at 08/03/20 0843  •  potassium chloride (K-DUR,KLOR-CON) CR tablet 20 mEq, 20 mEq, Oral, Daily, Breezy Lemus MD, 20 mEq at 08/03/20 0843  •  riFAXIMin (XIFAXAN) tablet 550 mg, 550 mg, Oral, BID, Breezy Lemus MD, 550 mg at 08/03/20 0841  •  sodium chloride nasal spray 2 spray, 2 spray, Each Nare, PRN, Breezy Lemus MD  •  spironolactone (ALDACTONE) tablet 50 mg, 50 mg, Oral, Daily, Breezy Lemus MD, 50 mg at 08/03/20 0841  •  traZODone (DESYREL) tablet 150 mg, 150 mg, Oral, Nightly, Wilner Arroyo MD, 150 mg at 08/02/20 2116     Special Precautions Level: III    Assessment/Plan     Assessment: Patient is requesting detox  Ativan detox begin with day 1  Patient was switched to Klonopin taper, he has completed at this a.m.  Opiate dependence iatrogenic  Patient is requesting detox  PRN clonidine detox protocol begin with day 1    Diabetes  Continue home meds, placed on consistent carbohydrate diet Accu-Cheks before meals and at bedtime  Metformin  Hypertension  Lasix lisinopril    Chronic liver disease  Nadolol, rifaximin, spironolactone and Lasix    COVID-19 exposure    Treatment Plan: We will refer to therapist to discuss discharge home with his wife.      Treatment Plan discussed with: Nurse Yanet MUNGUIA have reviewed and  approved the behavioral health treatment plans and problem list. Yes        Sarah Holder MD  08/03/20  09:28

## 2020-08-03 NOTE — PROGRESS NOTES
Patient has been scheduled the following appointment:     Dr. Erika [address], [place] [address] [phone]    Appointment:  August 6 2020 at 2:40pm

## 2020-08-03 NOTE — PLAN OF CARE
Problem: Patient Care Overview  Goal: Interprofessional Rounds/Family Conf  Outcome: Ongoing (interventions implemented as appropriate)  Flowsheets (Taken 7/31/2020 1209)  Participants: nursing;milieu/psych techs;patient;social work;psychiatrist;family  Summary: Treatment team staffing      1210:       DATA:      This provider is located at [facility], and the Patient  is seen remotely at [facility] using [employer]. The patient's condition being treated is appropriate for telehealth services. The provider identified herself as well as her credentials.   The patient has given consent to be seen remotely, and when consent is given they understand that the consent allows for patient identifiable information to be sent to a third party as needed.   They may refuse to be seen remotely at any time. The electronic data is encrypted and password protected, and the patient has been advised of the potential risks to privacy not withstanding such measures.     Therapist met individually with patient this date. Continued reviewing plan of care and aftercare.  Patient agreeable.     Staffed with Dr. Holder. She is recommending patient be assessed on an outpatient basis regarding his general weakness and use of cane. Patient had previously inquired about physical therapy. Patient reports that he has been evaluated by his PCP and that he feels that he can seek outpatient referral and that his wife will help him.       Patient signed consent for his wife Sherri Ryan at [phone];  Sherri was receptive and agreeable to patient returning home today or tomorrow.  She rpeorts that patient already has an order to do PT on an outpatient basis and plans to help him see these appointments through. She was unsure of the name of the provider, but felt confident helping him do this.  She inquired about patient continuing Elavil because of drug interactions.  Therapist atempted to reach out to Dr. Holder  to discuss this and possible discharge for patient. Unable to reach Dr. Holder this date.      Sherri was agreeable to patient returning home at any time and identified that home was safe guarded from firearms and other objects of harm.     1350: Therapist updated Dr. Holder this date. Dr. Holder indicates that the earliest she would discharge patient would be tomorrow.  Therapist also updated Dr. Holder regarding the spouses concerns regarding his Elavil. Dr. Holder indicates that she will order lab to assess this.  Therapist updated REID Holt and asked her to contact Dr. Holder to discuss. Also asked her to inform the patient that he is not being discharged today so that he can update his wife.  Reid Holt agreeable.      Clinical Maneuvering/Intervention:     Therapist assisted patient in processing above session content; acknowledged and normalized patient’s thoughts, feelings, and concerns.  Discussed the therapist/patient relationship and explain the parameters and limitations of relative confidentiality.  Also discussed the importance of active participation, and honesty to the treatment process.  Encouraged the patient to discuss/vent their feelings, frustrations, and fears concerning their ongoing medical issues and validated their feelings.     Discussed the importance of finding enjoyable activities and coping skills that the patient can engage in a regular basis. Discussed healthy coping skills such as distraction, self love, grounding, thought challenges/reframing, etc.  Provided patient with list of healthy coping skills this date. Discussed the importance of medication compliance.  Praised the patient for seeking help and spent the majority of the session building rapport.       Allowed patient to freely discuss issues without interruption or judgment. Provided safe, confidential environment to facilitate the development of positive therapeutic relationship and encourage open, honest communication.      Therapist  addressed discharge safety planning this date. Assisted patient in identifying risk factors which would indicate the need for higher level of care after discharge;  including thoughts to harm self or others and/or self-harming behavior. Encouraged patient to call 911, or present to the nearest emergency room should any of these events occur. Discussed crisis intervention services and means to access.  Encouraged securing any objects of harm.       Therapist completed integrated summary, treatment plan, and initiated social history this date.  Therapist is strongly encouraging family involvement in treatment.      Encouraged social distancing, regular mask wearing and hand washing due to COVID19.       ASSESSMENT:      The patient was seen for follow up this date. Patient calm and cooperative. Patient rates depression at 6/10 and anxiety at 8/10.  Patient denies SI/HI and AVH.  Patient appears future oriented and reports that he wants to be with his wife today. Tomorrow is their anniversary.      PLAN:       Patient to remain hospitalized this date.      Treatment team will focus efforts on stabilizing patient's acute symptoms while providing education on healthy coping and crisis management to reduce hospitalizations.   Patient requires daily psychiatrist evaluation and 24/7 nursing supervision to promote patient  safety.     Therapist will offer 1-4 individual sessions, 1 therapy group daily, family education, and appropriate referral.     Therapist recommends outpatient psych referral.  Patient signed consent for Erika.  Patient to return home with spouse at discharge.

## 2020-08-03 NOTE — PLAN OF CARE
Problem: Patient Care Overview  Goal: Plan of Care Review  Outcome: Ongoing (interventions implemented as appropriate)  Flowsheets (Taken 8/3/2020 1500)  Progress: improving  Plan of Care Reviewed With: patient  Patient Agreement with Plan of Care: agrees  Note:   Patient uses a cane when ambulating, reports anxiety and depression both at 8/10 with fair appetite and very poor sleep of 2 hours last night. Patient reports cravings at 6/10 with cold sweats, body aches, and leg cramps. New lab order placed. See orders for details. No acute distress noted, will continue to monitor.

## 2020-08-04 VITALS
SYSTOLIC BLOOD PRESSURE: 139 MMHG | TEMPERATURE: 96.7 F | HEART RATE: 83 BPM | DIASTOLIC BLOOD PRESSURE: 86 MMHG | OXYGEN SATURATION: 97 % | RESPIRATION RATE: 18 BRPM

## 2020-08-04 LAB
GLUCOSE BLDC GLUCOMTR-MCNC: 134 MG/DL (ref 70–130)
GLUCOSE BLDC GLUCOMTR-MCNC: 166 MG/DL (ref 70–130)

## 2020-08-04 PROCEDURE — 82962 GLUCOSE BLOOD TEST: CPT

## 2020-08-04 PROCEDURE — 94799 UNLISTED PULMONARY SVC/PX: CPT

## 2020-08-04 PROCEDURE — 63710000001 INSULIN ASPART PER 5 UNITS: Performed by: PSYCHIATRY & NEUROLOGY

## 2020-08-04 RX ADMIN — LISINOPRIL 10 MG: 10 TABLET ORAL at 08:32

## 2020-08-04 RX ADMIN — FLUTICASONE PROPIONATE 1 SPRAY: 50 SPRAY, METERED NASAL at 08:33

## 2020-08-04 RX ADMIN — CETIRIZINE HYDROCHLORIDE 10 MG: 10 TABLET, FILM COATED ORAL at 08:31

## 2020-08-04 RX ADMIN — LACTULOSE 20 G: 10 SOLUTION ORAL at 08:32

## 2020-08-04 RX ADMIN — RIFAXIMIN 550 MG: 550 TABLET ORAL at 08:32

## 2020-08-04 RX ADMIN — OXYBUTYNIN CHLORIDE 2.5 MG: 5 TABLET ORAL at 08:34

## 2020-08-04 RX ADMIN — ATORVASTATIN CALCIUM 80 MG: 40 TABLET, FILM COATED ORAL at 08:32

## 2020-08-04 RX ADMIN — INSULIN ASPART 2 UNITS: 100 INJECTION, SOLUTION INTRAVENOUS; SUBCUTANEOUS at 11:20

## 2020-08-04 RX ADMIN — POTASSIUM CHLORIDE 20 MEQ: 1500 TABLET, EXTENDED RELEASE ORAL at 08:31

## 2020-08-04 RX ADMIN — NADOLOL 20 MG: 40 TABLET ORAL at 08:32

## 2020-08-04 RX ADMIN — ASPIRIN 81 MG: 81 TABLET, COATED ORAL at 08:31

## 2020-08-04 RX ADMIN — IBUPROFEN 400 MG: 400 TABLET, FILM COATED ORAL at 02:56

## 2020-08-04 RX ADMIN — PANTOPRAZOLE SODIUM 40 MG: 40 TABLET, DELAYED RELEASE ORAL at 08:32

## 2020-08-04 RX ADMIN — SPIRONOLACTONE 50 MG: 25 TABLET ORAL at 08:32

## 2020-08-04 RX ADMIN — FUROSEMIDE 20 MG: 20 TABLET ORAL at 08:32

## 2020-08-04 NOTE — PROGRESS NOTES
Behavioral Health Discharge Summary             Please fax within 24 hours of discharge to [facility] at: [phone]      Member Name: Eduardo Ryan Member ID: [id number]   Authorization Number: [id number] Phone: [phone]   Member Address: [address] [place], [place] [address]vishal [place] [address]   Discharge Date: 08/04/2020 Level of Care at Discharge:    Facility: [facility] Staff Completing Form: Sai BYNUM   If the member is being discharged directly to a residential or extended care program, please specify the type below.   __Private Child-Caring Facility (PCC) Residential/Group Home   __Private Child-Caring Facility (PCC) Therapeutic Foster Care   __Residential Treatment Facility (RTF)   __Psychiatric Residential Treatment Facility (PRTF I or II)   __Long-Term Acute Inpatient Hospital Services or Extended Care Unit (ECU)   __Other (please specify):    Brief discharge summary of treatment received (for follow up by the case management team): D/C clinical with list of medications and follow up appts given to patient upon discharge.     BRIEF SUMMARY OF RECOMMENDATIONS FOR ONGOING TREATMENT     Discharged to where: Home   Discharge diagnoses: F 32.9   Axis I:    Axis II:    Axis III:    Axis IV:    Axis V:    Does the member understand his/her DX?  Yes          Medication     Dose     Schedule Supply/  Quantity  Given at Discharge RX Provided  Yes/No  If Rx Provided, Quantity RX Prior Auth Required  Yes/No Prior Auth  Completed                                                                                             Does the member understand the reason for taking these medications? Yes                                                           FOLLOW-UP APPOINTMENTS   Please schedule within 7 days of discharge and provide appointment details for all referred services.    PCP/Other Providers Involved in Treatment:    Appointment Type: [facility]  Provider Name: Dr. James Provider Phone:  [phone] Appointment Date: 08/06/2020 Appointment Time:   2:40 PM     Assessment   (new to OP services)        Case Management    Is the member already enrolled in case management?  Yes/No  If yes, date the CM was notified:    If no, was the CM referral offered?  Yes/No  Accepted? Yes/No    Is the Release of Information in the chart? Yes/No:      Medication Management (for member discharged with psychiatric medications):      A&D Treatment (for member with substance abuse/   dependence in the past year):      Medical Condition (for member with a medical condition):    Other recommended treatment:    Do you have any concerns about the discharge plan?  No    If yes, explain:    Was the member involved in the discharge planning?  Yes    If no, explain:    Was a copy of the discharge plan provided to the member?  Yes    If no, explain:

## 2020-08-04 NOTE — DISCHARGE SUMMARY
Date of Discharge:  2020    Presenting Problem/History of Present Illness  MDD (major depressive disorder) [F32.9]   Interval history, patient says he is doing well today; today is his 25th wedding anniversary, and he has hoping to get out and take his wife out for dinner.  He rates his depression and anxiety both at about 2 or 3, says he still misses his  parents.  He denies he is craving for any drugs and denies any withdrawal symptoms.  He denies SI HI or hallucinations or paranoia.  He is well oriented.  His appetite is good, he is still constipated and needs medication  Hospital Course  Patient was hospitalized on 2020 after he presented to the emergency room requesting detox, from opiates and benzos, he had been on these for prescription, and also said his family physician had recommended him to get off these medications.  His wife wife complained he has PTSD , he goes into a blank stare at times like he is really not there.  I saw him on 2020 patient reported that he has occasional flashbacks of his mother dying 20 years ago and father dying 2 years ago but also said he does not think it is too bad.  I placed him on Ativan detox and also PRN clonidine detox protocol.  He was continued on his home medications for diabetes , hypertension and hyperlipidemia, and also chronic liver disease..later in the day in the day on 2020  I received a call from the nurse saying that she had received phone call reporting that patient may have been exposed to COVID 19, medical consultation was requested.  Patient COVID-19 test came negative.  Patient was seen by the on-call physician on the weekend of 2020 and 2020.  His Ativan detox was changed to Klonopin taper which she completed on 8/3/2020.  I resumed patient care on 8/3/2020.  Patient was still reporting some depression but denied SI HI hallucinations or paranoia.  He was also well oriented I assisted with grief counseling patient was  receptive.  He was seen again on 8/4/2020 when he reported doing fairly well as described above.  He requested discharge,, also was anxious to be with his wife for his 25th wedding anniversary.  He was discharged on 8/4/2020.  Psychiatric follow-up was clinic scheduled for him with Dr. Miner on 8/6/2020 at 2:40 PM.  Nursing was also advised to schedule his medical follow-up with Dr. Cantu within 1 to 2 weeks.  Procedures Performed         Consults:   Consults     Date and Time Order Name Status Description    7/31/2020 1629 Inpatient Hospitalist Consult Completed             Condition on Discharge: Stable and improved      Discharge Disposition      Discharge Medications     Your medication list      ASK your doctor about these medications      Instructions Last Dose Given Next Dose Due   albuterol (2.5 MG/3ML) 0.083% nebulizer solution  Commonly known as:  PROVENTIL      Take 2.5 mg by nebulization Every 6 (Six) Hours As Needed for Wheezing or Shortness of Air.       ALPRAZolam 1 MG tablet  Commonly known as:  XANAX      Take 1 mg by mouth Daily As Needed for Anxiety.       amitriptyline 25 MG tablet  Commonly known as:  ELAVIL      Take 25 mg by mouth Every Night.       aspirin 81 MG EC tablet      Take 81 mg by mouth Daily.       atorvastatin 80 MG tablet  Commonly known as:  LIPITOR      Take 80 mg by mouth Daily.       Ertugliflozin L-PyroglutamicAc 15 MG tablet  Commonly known as:  STEGLATRO      Take 15 mg by mouth Every Morning.       fluticasone 50 MCG/ACT nasal spray  Commonly known as:  FLONASE      1 spray into the nostril(s) as directed by provider Daily.       furosemide 20 MG tablet  Commonly known as:  LASIX      Take 20 mg by mouth Daily.       gabapentin 800 MG tablet  Commonly known as:  NEURONTIN      Take 800 mg by mouth 3 (Three) Times a Day.       HYDROcodone-acetaminophen  MG per tablet  Commonly known as:  NORCO      Take 1 tablet by mouth Every 8 (Eight) Hours As Needed for Moderate  Pain .       ipratropium 0.02 % nebulizer solution  Commonly known as:  ATROVENT      Take 500 mcg by nebulization 4 (Four) Times a Day As Needed for Wheezing or Shortness of Air.       lactulose 10 GM/15ML solution  Commonly known as:  CHRONULAC      Take 20 g by mouth Daily.       lisinopril 10 MG tablet  Commonly known as:  PRINIVIL,ZESTRIL      Take 10 mg by mouth Daily.       loratadine 10 MG tablet  Commonly known as:  CLARITIN      Take 10 mg by mouth Daily.       metFORMIN 1000 MG tablet  Commonly known as:  GLUCOPHAGE      Take 1,000 mg by mouth 2 (Two) Times a Day With Meals.       nadolol 20 MG tablet  Commonly known as:  CORGARD      Take 20 mg by mouth Daily.       nitroglycerin 0.4 MG SL tablet  Commonly known as:  NITROSTAT      Place 0.4 mg under the tongue Every 5 (Five) Minutes As Needed for Chest Pain. Take no more than 3 doses in 15 minutes.       oxybutynin 5 MG tablet  Commonly known as:  DITROPAN      Take 2.5 mg by mouth 2 (Two) Times a Day.       pantoprazole 40 MG EC tablet  Commonly known as:  PROTONIX      Take 40 mg by mouth Daily.       potassium chloride 20 MEQ CR tablet  Commonly known as:  K-DUR,KLOR-CON      Take 20 mEq by mouth Daily.       riFAXIMin 550 MG tablet  Commonly known as:  XIFAXAN      Take 550 mg by mouth 2 (two) times a day.       spironolactone 50 MG tablet  Commonly known as:  ALDACTONE      Take 50 mg by mouth Daily.       traZODone 150 MG tablet  Commonly known as:  DESYREL      Take 150 mg by mouth Every Night.              Lab Results (last 24 hours)     Procedure Component Value Units Date/Time    POC Glucose Once [[id number]]  (Abnormal) Collected:  08/03/20 1610    Specimen:  Blood Updated:  08/03/20 1618     Glucose 152 mg/dL     Amitriptyline Level [[id number]] Collected:  08/03/20 1435    Specimen:  Blood Updated:  08/03/20 1454    POC Glucose Once [[id number]]  (Abnormal) Collected:  08/03/20 1048    Specimen:  Blood Updated:  08/03/20 1054     Glucose 217  "mg/dL         Follow-up Appointments  No future appointments.      Discharge Diagnosis: Depression NOS    Benzodiazepine dependence.  Iatrogenic patient completed his detox successfully  Opiate dependence iatrogenic.  Patient completed his detox successfully  Wilner Arroyo MD   Physician   Psychiatry   Progress Notes   Signed   Date of Service:  20   Creation Time:  20 1000            Signed             Show:Clear all  [x]Manual[x]Template[x]Copied    Added by:  [x]Wilner Arroyo MD    []Mary for details  INPATIENT PSYCHIATRIC PROGRESS NOTE     Name:  Eduardo Ryan  :  1965  MRN:  [id number]  Visit Number:  [id number]  Length of stay:  3     SUBJECTIVE  CC/Focus of Exam: Mood disturbance, benzodiazepine dependence     INTERVAL HISTORY:  Patient reports feeling okay overall.  Mood and anxiety are stable.  Tolerating detox from Xanax and Minerva.  Requiring very few as needed medications for withdrawal.  Still uncertain about stopping Norco but has not required any PRNs since medication was discontinued.  Asked about returning home in the next day or 2     Depression rating 4/10  Anxiety rating 4/10  Sleep: Better  Withdrawal sx: Anxiety  Cravin/10     Review of Systems   Constitutional: Negative.    Respiratory: Negative.    Cardiovascular: Negative.    Gastrointestinal: Negative.    Musculoskeletal: Negative.    Psychiatric/Behavioral: Positive for dysphoric mood. The patient is nervous/anxious.          OBJECTIVE     Temp:  [97 °F (36.1 °C)-97.8 °F (36.6 °C)] 97.5 °F (36.4 °C)  Heart Rate:  [75-82] 78  Resp:  [16-18] 18  BP: (132-152)/(69-88) 144/88     MENTAL STATUS EXAM:  Appearance: Dressed in hospital scrubs, fair hygeine.   Cooperation:Cooperative  Psychomotor: Improving psychomotor retardation, No EPS, No motor tics  Speech-normal rate, amount.  Mood \"feeling a little better\"   Affect-congruent, directed  Thought Content-goal directed, no delusional material " present  Thought process-linear, organized.  Suicidality: No SI  Homicidality: No HI  Perception: No AH/VH  Insight-fair   Judgement-fair              Lab Results (last 24 hours)      Procedure Component Value Units Date/Time     POC Glucose Once [[id number]]  (Abnormal) Collected:  08/02/20 0718     Specimen:  Blood Updated:  08/02/20 0726       Glucose 161 mg/dL       POC Glucose Once [[id number]]  (Abnormal) Collected:  08/01/20 1613     Specimen:  Blood Updated:  08/01/20 1623       Glucose 138 mg/dL                                      Imaging Results (Last 24 Hours)      Procedure Component Value Units Date/Time     US Venous Doppler Lower Extremity Bilateral (duplex) [[id number]] Collected:  08/01/20 2001       Updated:  08/01/20 2003     Narrative:        US Veins LE Duplex BILAT     HISTORY:   54-year-old male with chest pain and elevated d-dimer today.     TECHNIQUE:   Real-time ultrasound was performed of both lower extremities utilizing spectral and color Doppler with compression and augmentation techniques.     COMPARISON:  None available.     FINDINGS:  Right Lower Extremity:  There is no deep venous thrombus seen in the right lower extremity, including the right common femoral veins, right femoral veins and right popliteal veins.  Normal compressibility and respiratory phasicity was visualized.  No calf vein thrombus.     Left Lower Extremity:  There is no deep venous thrombus seen in the left lower extremity, including the left common femoral veins, left femoral veins and left popliteal veins.   Normal compressibility and respiratory phasicity was visualized.  No calf vein thrombus.           Impression:        No deep venous thrombus seen in either lower extremity.     Signer Name: Colten Nichols MD   Signed: 8/1/2020 8:01 PM   Workstation Name: [id number]    [facility]                    ECG/EMG Results (most recent)      None             ALLERGIES: Peanut oil and Sulfa  antibiotics        Current Facility-Administered Medications:   •  acetaminophen (TYLENOL) tablet 650 mg, 650 mg, Oral, Q6H PRN, Breezy Lemus MD, 650 mg at 20 08  •  albuterol (PROVENTIL) nebulizer solution 0.083% 2.5 mg/3mL, 2.5 mg, Nebulization, Q6H PRN, Breezy Lemus MD  •  aluminum-magnesium hydroxide-simethicone (MAALOX MAX) 400-400-40 MG/5ML suspension 15 mL, 15 mL, Oral, Q6H PRN, Breezy Lemus MD  •  amitriptyline (ELAVIL) tablet 25 mg, 25 mg, Oral, Nightly, Breezy Lemus MD, 25 mg at 20  •  aspirin EC tablet 81 mg, 81 mg, Oral, Daily, Breezy Lemus MD, 81 mg at 20 0954  •  atorvastatin (LIPITOR) tablet 80 mg, 80 mg, Oral, Daily, Breezy Lemus MD, 80 mg at 20 08  •  benzonatate (TESSALON) capsule 100 mg, 100 mg, Oral, TID PRN, Breezy Lemus MD  •  benztropine (COGENTIN) tablet 2 mg, 2 mg, Oral, Once PRN **OR** benztropine (COGENTIN) injection 1 mg, 1 mg, Intramuscular, Once PRN, Breezy Lemus MD  •  cetirizine (zyrTEC) tablet 10 mg, 10 mg, Oral, Daily, Breezy Lemus MD, 10 mg at 20 0954  •  clonazePAM (KlonoPIN) tablet 0.5 mg, 0.5 mg, Oral, Q12H, Wilner Arroyo MD, 0.5 mg at 20 0828  •  [] cloNIDine (CATAPRES) tablet 0.1 mg, 0.1 mg, Oral, 4x Daily PRN **FOLLOWED BY** cloNIDine (CATAPRES) tablet 0.1 mg, 0.1 mg, Oral, TID PRN **FOLLOWED BY** [START ON 8/3/2020] cloNIDine (CATAPRES) tablet 0.1 mg, 0.1 mg, Oral, BID PRN **FOLLOWED BY** [START ON 2020] cloNIDine (CATAPRES) tablet 0.1 mg, 0.1 mg, Oral, Daily PRN, Sarah Holder MD  •  cyclobenzaprine (FLEXERIL) tablet 10 mg, 10 mg, Oral, TID PRN, Sarah Holder MD, 10 mg at 201  •  dextrose (D50W) 25 g/ 50mL Intravenous Solution 25 g, 25 g, Intravenous, Q15 Min PRN, Breezy Lemus MD  •  dextrose (GLUTOSE) oral gel 15 g, 15 g, Oral, Q15 Min PRN, Breezy Lemus MD  •  dicyclomine (BENTYL) capsule 10 mg, 10 mg, Oral, TID PRN, Sarah Holder  MD Chana  •  famotidine (PEPCID) tablet 20 mg, 20 mg, Oral, BID PRN, Breezy Lemus MD  •  fluticasone (FLONASE) 50 MCG/ACT nasal spray 1 spray, 1 spray, Nasal, Daily, Breezy Lemus MD, 1 spray at 20 0827  •  furosemide (LASIX) tablet 20 mg, 20 mg, Oral, Daily, Breezy Lemus MD, 20 mg at 20 0826  •  glucagon (human recombinant) (GLUCAGEN DIAGNOSTIC) injection 1 mg, 1 mg, Subcutaneous, Q15 Min PRN, Breezy Lemus MD  •  hydrOXYzine (ATARAX) tablet 50 mg, 50 mg, Oral, Q6H PRN, Breezy Lemus MD, 50 mg at 20 0009  •  ibuprofen (ADVIL,MOTRIN) tablet 400 mg, 400 mg, Oral, Q6H PRN, Breezy Lemus MD, 400 mg at 20 0525  •  insulin aspart (novoLOG) injection 0-7 Units, 0-7 Units, Subcutaneous, TID AC, Breezy Lemus MD, 2 Units at 20 0756  •  ipratropium (ATROVENT) nebulizer solution 0.5 mg, 500 mcg, Nebulization, 4x Daily PRN, Breezy Lemus MD  •  lactulose (CHRONULAC) 10 GM/15ML solution 20 g, 20 g, Oral, Daily, Breezy Lemus MD, 20 g at 20 0825  •  lisinopril (PRINIVIL,ZESTRIL) tablet 10 mg, 10 mg, Oral, Daily, Breezy Lemus MD, 10 mg at 20 0954  •  loperamide (IMODIUM) capsule 2 mg, 2 mg, Oral, Q2H PRN, Breezy Lemus MD  •  [] LORazepam (ATIVAN) tablet 2 mg, 2 mg, Oral, Q4H PRN, 2 mg at 20 1540 **FOLLOWED BY** LORazepam (ATIVAN) tablet 1.5 mg, 1.5 mg, Oral, Q4H PRN **FOLLOWED BY** [START ON 8/3/2020] LORazepam (ATIVAN) tablet 1 mg, 1 mg, Oral, Q4H PRN **FOLLOWED BY** [START ON 2020] LORazepam (ATIVAN) tablet 0.5 mg, 0.5 mg, Oral, Q4H PRN, Sarah Holder MD  •  magnesium hydroxide (MILK OF MAGNESIA) suspension 2400 mg/10mL 10 mL, 10 mL, Oral, Daily PRN, Breezy Lemus MD  •  nadolol (CORGARD) tablet 20 mg, 20 mg, Oral, Daily, Breezy Lemus MD, 20 mg at 20 0825  •  nitroglycerin (NITROSTAT) SL tablet 0.4 mg, 0.4 mg, Sublingual, Q5 Min PRN, Breezy Lemus MD  •  ondansetron (ZOFRAN) tablet 4 mg, 4 mg,  Oral, Q6H PRN, Breezy Lemus MD  •  oxybutynin (DITROPAN) tablet 2.5 mg, 2.5 mg, Oral, BID, Breezy Lemus MD, 2.5 mg at 08/02/20 0826  •  pantoprazole (PROTONIX) EC tablet 40 mg, 40 mg, Oral, Daily, Breezy Lemus MD, 40 mg at 08/02/20 0828  •  potassium chloride (K-DUR,KLOR-CON) CR tablet 20 mEq, 20 mEq, Oral, Daily, Breezy Lemus MD, 20 mEq at 08/02/20 0828  •  riFAXIMin (XIFAXAN) tablet 550 mg, 550 mg, Oral, BID, Breezy Lemus MD, 550 mg at 08/02/20 0826  •  sodium chloride nasal spray 2 spray, 2 spray, Each Nare, PRN, Breezy Lemus MD  •  spironolactone (ALDACTONE) tablet 50 mg, 50 mg, Oral, Daily, Breezy Lemus MD, 50 mg at 08/02/20 0825  •  traZODone (DESYREL) tablet 150 mg, 150 mg, Oral, Nightly, Wilner Arroyo MD, 150 mg at 08/01/20 2041     Reviewed chart, notes, vitals, labs and EKG personally     ASSESSMENT & PLAN:       MDD (major depressive disorder)     Major depressive disorder, moderate, recurrent  -Patient with mood disturbance, anxiety, reported decompensation by wife of declining functional status.  Admit for crisis stabilization  -Patient requests not to add or increase medications for depression at this time  -Ascertain outpatient care  -Patient asked about discharge in the next 1 to 2 days     Benzodiazepine dependence  -Patient requesting to detox off of Xanax  -Placed on brief clonazepam taper with PRN's available but tolerating the protocol well  -Monitor with JUMA     Opioid dependence  -Patient wavering on stopping Norco, saying he has persistent back pain.  However he is done well without any of the medication since admission  -Continue clonidine protocol.  Required minimal comfort medication thus far     Elevated d-dimer  -CT PE protocol negative  -Ultrasound for DVT negative     Diabetes mellitus  -Metformin currently held due to elevated lactic acid  -Continue Accu-Cheks     Hypertension  -Continue lisinopril     Chronic liver disease  -Continue nadolol,  rifaximin, spironolactone and Lasix     COVID-19 exposure  -Patient reported exposure to a family member with potential COVID-19 infection  -Hospitalist was consulted.  Appreciate involvement                              Sarah Holder MD  08/04/20  09:45    Time: More than 30 minutes between patient visit and chart completion

## 2020-08-04 NOTE — PLAN OF CARE
Problem: Patient Care Overview  Goal: Plan of Care Review  Outcome: Ongoing (interventions implemented as appropriate)  Flowsheets  Taken 8/4/2020 0447  Progress: no change  Outcome Summary: Pt reports he is hopeful for discharge today because it is his anniversary. Rates anxiety 5/10 depression 6/10. Denies SI HI AVH.  Taken 8/3/2020 2108  Plan of Care Reviewed With: patient  Patient Agreement with Plan of Care: agrees

## 2020-08-05 LAB
AMITRIP SERPL-MCNC: 30 NG/ML
AMITRIP+NOR SERPL-MCNC: <50 NG/ML (ref 80–200)
NORTRIP SERPL-MCNC: <20 NG/ML

## 2022-03-15 ENCOUNTER — HOSPITAL ENCOUNTER (EMERGENCY)
Facility: HOSPITAL | Age: 57
Discharge: LEFT BEFORE DISCHARGE | End: 2022-03-15
Attending: STUDENT IN AN ORGANIZED HEALTH CARE EDUCATION/TRAINING PROGRAM | Admitting: STUDENT IN AN ORGANIZED HEALTH CARE EDUCATION/TRAINING PROGRAM

## 2022-03-15 ENCOUNTER — APPOINTMENT (OUTPATIENT)
Dept: GENERAL RADIOLOGY | Facility: HOSPITAL | Age: 57
End: 2022-03-15

## 2022-03-15 VITALS
TEMPERATURE: 97.8 F | WEIGHT: 249 LBS | OXYGEN SATURATION: 99 % | DIASTOLIC BLOOD PRESSURE: 85 MMHG | HEIGHT: 71 IN | RESPIRATION RATE: 18 BRPM | BODY MASS INDEX: 34.86 KG/M2 | SYSTOLIC BLOOD PRESSURE: 105 MMHG | HEART RATE: 81 BPM

## 2022-03-15 DIAGNOSIS — U07.1 COVID-19: Primary | ICD-10-CM

## 2022-03-15 LAB
A-A DO2: 7.3 MMHG (ref 0–300)
ALBUMIN SERPL-MCNC: 4.33 G/DL (ref 3.5–5.2)
ALBUMIN/GLOB SERPL: 1.5 G/DL
ALP SERPL-CCNC: 192 U/L (ref 39–117)
ALT SERPL W P-5'-P-CCNC: 40 U/L (ref 1–41)
ANION GAP SERPL CALCULATED.3IONS-SCNC: 12 MMOL/L (ref 5–15)
ARTERIAL PATENCY WRIST A: ABNORMAL
AST SERPL-CCNC: 36 U/L (ref 1–40)
ATMOSPHERIC PRESS: 730 MMHG
BASE EXCESS BLDA CALC-SCNC: 1.9 MMOL/L (ref 0–2)
BASOPHILS # BLD AUTO: 0.02 10*3/MM3 (ref 0–0.2)
BASOPHILS NFR BLD AUTO: 0.6 % (ref 0–1.5)
BDY SITE: ABNORMAL
BILIRUB SERPL-MCNC: 0.7 MG/DL (ref 0–1.2)
BODY TEMPERATURE: 0 C
BUN SERPL-MCNC: 16 MG/DL (ref 6–20)
BUN/CREAT SERPL: 19.3 (ref 7–25)
CALCIUM SPEC-SCNC: 10 MG/DL (ref 8.6–10.5)
CHLORIDE SERPL-SCNC: 96 MMOL/L (ref 98–107)
CO2 BLDA-SCNC: 27.9 MMOL/L (ref 22–33)
CO2 SERPL-SCNC: 23 MMOL/L (ref 22–29)
COHGB MFR BLD: 1.1 % (ref 0–5)
CREAT SERPL-MCNC: 0.83 MG/DL (ref 0.76–1.27)
CRP SERPL-MCNC: <0.3 MG/DL (ref 0–0.5)
D DIMER PPP FEU-MCNC: 0.74 MCGFEU/ML (ref 0–0.5)
D-LACTATE SERPL-SCNC: 2.2 MMOL/L (ref 0.5–2)
DEPRECATED RDW RBC AUTO: 39.8 FL (ref 37–54)
EGFRCR SERPLBLD CKD-EPI 2021: 101.4 ML/MIN/1.73
EOSINOPHIL # BLD AUTO: 0.08 10*3/MM3 (ref 0–0.4)
EOSINOPHIL NFR BLD AUTO: 2.6 % (ref 0.3–6.2)
ERYTHROCYTE [DISTWIDTH] IN BLOOD BY AUTOMATED COUNT: 13.2 % (ref 12.3–15.4)
FERRITIN SERPL-MCNC: 38.25 NG/ML (ref 30–400)
FLUAV RNA RESP QL NAA+PROBE: NOT DETECTED
FLUBV RNA RESP QL NAA+PROBE: NOT DETECTED
GLOBULIN UR ELPH-MCNC: 2.9 GM/DL
GLUCOSE BLDC GLUCOMTR-MCNC: 473 MG/DL (ref 70–130)
GLUCOSE SERPL-MCNC: 478 MG/DL (ref 65–99)
HCO3 BLDA-SCNC: 26.6 MMOL/L (ref 20–26)
HCT VFR BLD AUTO: 41.8 % (ref 37.5–51)
HCT VFR BLD CALC: 44.2 % (ref 38–51)
HGB BLD-MCNC: 14.3 G/DL (ref 13–17.7)
HGB BLDA-MCNC: 14.4 G/DL (ref 14–18)
IMM GRANULOCYTES # BLD AUTO: 0.01 10*3/MM3 (ref 0–0.05)
IMM GRANULOCYTES NFR BLD AUTO: 0.3 % (ref 0–0.5)
INHALED O2 CONCENTRATION: 21 %
LDH SERPL-CCNC: 186 U/L (ref 135–225)
LYMPHOCYTES # BLD AUTO: 1.01 10*3/MM3 (ref 0.7–3.1)
LYMPHOCYTES NFR BLD AUTO: 32.4 % (ref 19.6–45.3)
Lab: ABNORMAL
MCH RBC QN AUTO: 28.6 PG (ref 26.6–33)
MCHC RBC AUTO-ENTMCNC: 34.2 G/DL (ref 31.5–35.7)
MCV RBC AUTO: 83.6 FL (ref 79–97)
METHGB BLD QL: 0.3 % (ref 0–3)
MODALITY: ABNORMAL
MONOCYTES # BLD AUTO: 0.32 10*3/MM3 (ref 0.1–0.9)
MONOCYTES NFR BLD AUTO: 10.3 % (ref 5–12)
NEUTROPHILS NFR BLD AUTO: 1.68 10*3/MM3 (ref 1.7–7)
NEUTROPHILS NFR BLD AUTO: 53.8 % (ref 42.7–76)
NOTE: ABNORMAL
NRBC BLD AUTO-RTO: 0 /100 WBC (ref 0–0.2)
OXYHGB MFR BLDV: 96.2 % (ref 94–99)
PCO2 BLDA: 41.1 MM HG (ref 35–45)
PCO2 TEMP ADJ BLD: ABNORMAL MM[HG]
PH BLDA: 7.42 PH UNITS (ref 7.35–7.45)
PH, TEMP CORRECTED: ABNORMAL
PLATELET # BLD AUTO: 67 10*3/MM3 (ref 140–450)
PMV BLD AUTO: 10.6 FL (ref 6–12)
PO2 BLDA: 89.8 MM HG (ref 83–108)
PO2 TEMP ADJ BLD: ABNORMAL MM[HG]
POTASSIUM SERPL-SCNC: 4.9 MMOL/L (ref 3.5–5.2)
PROCALCITONIN SERPL-MCNC: 0.04 NG/ML (ref 0–0.25)
PROT SERPL-MCNC: 7.2 G/DL (ref 6–8.5)
RBC # BLD AUTO: 5 10*6/MM3 (ref 4.14–5.8)
SAO2 % BLDCOA: 97.6 % (ref 94–99)
SARS-COV-2 RNA RESP QL NAA+PROBE: DETECTED
SODIUM SERPL-SCNC: 131 MMOL/L (ref 136–145)
VENTILATOR MODE: ABNORMAL
WBC NRBC COR # BLD: 3.12 10*3/MM3 (ref 3.4–10.8)

## 2022-03-15 PROCEDURE — 83050 HGB METHEMOGLOBIN QUAN: CPT

## 2022-03-15 PROCEDURE — 99284 EMERGENCY DEPT VISIT MOD MDM: CPT

## 2022-03-15 PROCEDURE — 82962 GLUCOSE BLOOD TEST: CPT

## 2022-03-15 PROCEDURE — 99283 EMERGENCY DEPT VISIT LOW MDM: CPT

## 2022-03-15 PROCEDURE — 85379 FIBRIN DEGRADATION QUANT: CPT | Performed by: PHYSICIAN ASSISTANT

## 2022-03-15 PROCEDURE — 82375 ASSAY CARBOXYHB QUANT: CPT

## 2022-03-15 PROCEDURE — 83605 ASSAY OF LACTIC ACID: CPT | Performed by: PHYSICIAN ASSISTANT

## 2022-03-15 PROCEDURE — 87636 SARSCOV2 & INF A&B AMP PRB: CPT | Performed by: PHYSICIAN ASSISTANT

## 2022-03-15 PROCEDURE — 83615 LACTATE (LD) (LDH) ENZYME: CPT | Performed by: PHYSICIAN ASSISTANT

## 2022-03-15 PROCEDURE — 85025 COMPLETE CBC W/AUTO DIFF WBC: CPT | Performed by: PHYSICIAN ASSISTANT

## 2022-03-15 PROCEDURE — 84145 PROCALCITONIN (PCT): CPT | Performed by: PHYSICIAN ASSISTANT

## 2022-03-15 PROCEDURE — 82805 BLOOD GASES W/O2 SATURATION: CPT

## 2022-03-15 PROCEDURE — 36600 WITHDRAWAL OF ARTERIAL BLOOD: CPT

## 2022-03-15 PROCEDURE — 82728 ASSAY OF FERRITIN: CPT | Performed by: PHYSICIAN ASSISTANT

## 2022-03-15 PROCEDURE — 71045 X-RAY EXAM CHEST 1 VIEW: CPT

## 2022-03-15 PROCEDURE — 80053 COMPREHEN METABOLIC PANEL: CPT | Performed by: PHYSICIAN ASSISTANT

## 2022-03-15 PROCEDURE — 86140 C-REACTIVE PROTEIN: CPT | Performed by: PHYSICIAN ASSISTANT

## 2022-03-15 RX ORDER — DEXAMETHASONE 6 MG/1
6 TABLET ORAL
Qty: 7 TABLET | Refills: 0 | Status: SHIPPED | OUTPATIENT
Start: 2022-03-15

## 2022-03-15 RX ORDER — SODIUM CHLORIDE 0.9 % (FLUSH) 0.9 %
10 SYRINGE (ML) INJECTION AS NEEDED
Status: DISCONTINUED | OUTPATIENT
Start: 2022-03-15 | End: 2022-03-16 | Stop reason: HOSPADM

## 2022-03-15 RX ADMIN — METFORMIN HYDROCHLORIDE 1000 MG: 500 TABLET ORAL at 21:38

## 2022-03-15 RX ADMIN — SODIUM CHLORIDE 1000 ML: 9 INJECTION, SOLUTION INTRAVENOUS at 21:08

## 2022-03-16 NOTE — ED PROVIDER NOTES
Subjective   Patient states that he has been having some weakness over the last couple days and was diagnosed with Covid in [place] today.  Patient then went to urgent care and they stated he was tested positive for Covid 2.  Patient is a former smoker has had history of coronary artery disease, diabetes, hypertension, PTSD.      History provided by:  Patient   used: No    Weakness - Generalized  Severity:  Moderate  Onset quality:  Sudden  Duration:  1 day  Timing:  Constant  Progression:  Worsening  Chronicity:  New  Relieved by:  None tried  Worsened by:  Nothing  Ineffective treatments:  None tried  Associated symptoms: shortness of breath    Associated symptoms: no chest pain, no cough, no diarrhea, no dysuria, no fever, no headaches, no nausea and no vomiting    Risk factors: coronary artery disease and diabetes        Review of Systems   Constitutional: Positive for activity change and appetite change. Negative for chills and fever.   HENT: Negative for congestion, ear pain and sore throat.    Respiratory: Positive for shortness of breath. Negative for cough and wheezing.    Cardiovascular: Negative for chest pain.   Gastrointestinal: Negative for diarrhea, nausea and vomiting.   Genitourinary: Negative for dysuria and flank pain.   Skin: Negative for rash.   Neurological: Negative for headaches.   Psychiatric/Behavioral: The patient is not nervous/anxious.    All other systems reviewed and are negative.      Past Medical History:   Diagnosis Date   • Cardiac disease 2003    40 % [facility]   • Diabetes (CMS/HCC)    • HTN (hypertension)    • Liver failure (CMS/HCC)     stage 4    • PTSD (post-traumatic stress disorder)    • Sleep apnea        Allergies   Allergen Reactions   • Peanut Oil Nausea Only   • Sulfa Antibiotics Hives       Past Surgical History:   Procedure Laterality Date   • CARDIAC CATHETERIZATION  2003    40 % [facility]       Family History   Problem Relation Age of Onset   • Aortic aneurysm Father        Social History     Socioeconomic History   • Marital status:    Tobacco Use   • Smoking status: Former Smoker     Packs/day: 1.00     Years: 15.00     Pack years: 15.00     Types: Cigarettes   Substance and Sexual Activity   • Sexual activity: Not Currently     Partners: Female           Objective   Physical Exam  Vitals and nursing note reviewed.   Constitutional:       Appearance: He is well-developed.   HENT:      Head: Normocephalic.   Cardiovascular:      Rate and Rhythm: Normal rate and regular rhythm.   Pulmonary:      Effort: Pulmonary effort is normal.      Breath sounds: Normal breath sounds.   Abdominal:      General: Bowel sounds are normal.      Palpations: Abdomen is soft.      Tenderness: There is no abdominal tenderness.   Musculoskeletal:         General: Normal range of motion.      Cervical back: Neck supple.   Skin:     General: Skin is warm and dry.   Neurological:      Mental Status: He is alert and oriented to person, place, and time.   Psychiatric:         Behavior: Behavior normal.         Thought Content: Thought content normal.         Judgment: Judgment normal.         Procedures           ED Course  ED Course as of 03/15/22 2305   Tue Mar 15, 2022   2157 Report given to lazara [LC]   2255 CXR rad interpreted: No acute cardiopulmonary findings. [RB]   2300 Patient refuses to stay for his CT PE protocol based on this information patient will be discharged AGAINST MEDICAL ADVICE.  Patient will be treated for his COVID-19 with Decadron 6 mg daily for 7 days. [RB]      ED Course User Index  [LC] Carol Bermudez PA  [RB] Radames Crooks II, PA                                                 MDM  Number of Diagnoses or Management Options  COVID-19: new and requires workup     Amount and/or Complexity of Data Reviewed  Clinical lab tests: reviewed  Tests in the radiology section of CPT®:  reviewed    Risk of Complications, Morbidity, and/or Mortality  Presenting problems: moderate  Diagnostic procedures: moderate    Patient Progress  Patient progress: other (comment) (Pt signed out AMA)      Final diagnoses:   COVID-19       ED Disposition  ED Disposition     ED Disposition   AMA    Condition   --    Comment   --             Regan Cantu MD  [address] [facility] [address]  [phone]    Schedule an appointment as soon as possible for a visit            Medication List      New Prescriptions    dexamethasone 6 MG tablet  Commonly known as: DECADRON  Take 1 tablet by mouth Daily With Breakfast.           Where to Get Your Medications      You can get these medications from any pharmacy    Bring a paper prescription for each of these medications  · dexamethasone 6 MG tablet          Radames Crooks II, PA  03/15/22 2460

## 2022-03-16 NOTE — DISCHARGE INSTRUCTIONS
CT PE protocol is highly recommended secondary to elevation of D-dimer.  Should you experience increased shortness of breath, return to your nearest emergency department for further evaluation

## 2022-03-16 NOTE — ED NOTES
Patient states that he did not want to get a CT PE protocol and that he wants to go home. Patient was explained the risks and benefits of staying and leaving at this time. Patient verbalized understanding. AMA papers signed.